# Patient Record
Sex: MALE | Race: ASIAN | NOT HISPANIC OR LATINO
[De-identification: names, ages, dates, MRNs, and addresses within clinical notes are randomized per-mention and may not be internally consistent; named-entity substitution may affect disease eponyms.]

---

## 2020-03-02 PROBLEM — Z00.00 ENCOUNTER FOR PREVENTIVE HEALTH EXAMINATION: Status: ACTIVE | Noted: 2020-03-02

## 2020-03-05 ENCOUNTER — APPOINTMENT (OUTPATIENT)
Dept: THORACIC SURGERY | Facility: CLINIC | Age: 75
End: 2020-03-05
Payer: MEDICARE

## 2020-03-05 VITALS
OXYGEN SATURATION: 96 % | HEIGHT: 62 IN | HEART RATE: 93 BPM | RESPIRATION RATE: 18 BRPM | TEMPERATURE: 97.4 F | BODY MASS INDEX: 25.76 KG/M2 | SYSTOLIC BLOOD PRESSURE: 128 MMHG | WEIGHT: 140 LBS | DIASTOLIC BLOOD PRESSURE: 73 MMHG

## 2020-03-05 DIAGNOSIS — Z87.891 PERSONAL HISTORY OF NICOTINE DEPENDENCE: ICD-10-CM

## 2020-03-05 DIAGNOSIS — Z87.438 PERSONAL HISTORY OF OTHER DISEASES OF MALE GENITAL ORGANS: ICD-10-CM

## 2020-03-05 DIAGNOSIS — Z86.39 PERSONAL HISTORY OF OTHER ENDOCRINE, NUTRITIONAL AND METABOLIC DISEASE: ICD-10-CM

## 2020-03-05 DIAGNOSIS — Z86.79 PERSONAL HISTORY OF OTHER DISEASES OF THE CIRCULATORY SYSTEM: ICD-10-CM

## 2020-03-05 PROCEDURE — 99205 OFFICE O/P NEW HI 60 MIN: CPT

## 2020-03-06 ENCOUNTER — APPOINTMENT (OUTPATIENT)
Dept: CARDIOLOGY | Facility: CLINIC | Age: 75
End: 2020-03-06
Payer: MEDICARE

## 2020-03-06 ENCOUNTER — NON-APPOINTMENT (OUTPATIENT)
Age: 75
End: 2020-03-06

## 2020-03-06 VITALS
DIASTOLIC BLOOD PRESSURE: 72 MMHG | SYSTOLIC BLOOD PRESSURE: 126 MMHG | WEIGHT: 138 LBS | TEMPERATURE: 98.6 F | OXYGEN SATURATION: 96 % | HEART RATE: 93 BPM | HEIGHT: 62.5 IN | RESPIRATION RATE: 18 BRPM | BODY MASS INDEX: 24.76 KG/M2

## 2020-03-06 DIAGNOSIS — R06.02 SHORTNESS OF BREATH: ICD-10-CM

## 2020-03-06 DIAGNOSIS — I10 ESSENTIAL (PRIMARY) HYPERTENSION: ICD-10-CM

## 2020-03-06 DIAGNOSIS — Z01.810 ENCOUNTER FOR PREPROCEDURAL CARDIOVASCULAR EXAMINATION: ICD-10-CM

## 2020-03-06 DIAGNOSIS — E78.5 HYPERLIPIDEMIA, UNSPECIFIED: ICD-10-CM

## 2020-03-06 PROBLEM — Z87.891 FORMER SMOKER: Status: ACTIVE | Noted: 2020-03-06

## 2020-03-06 PROBLEM — Z87.438 HISTORY OF BENIGN PROSTATIC HYPERPLASIA: Status: RESOLVED | Noted: 2020-03-06 | Resolved: 2020-03-06

## 2020-03-06 PROBLEM — Z86.39 HISTORY OF HYPERLIPIDEMIA: Status: RESOLVED | Noted: 2020-03-06 | Resolved: 2020-03-06

## 2020-03-06 PROBLEM — Z86.79 HISTORY OF HYPERTENSION: Status: RESOLVED | Noted: 2020-03-06 | Resolved: 2020-03-06

## 2020-03-06 PROCEDURE — 93306 TTE W/DOPPLER COMPLETE: CPT

## 2020-03-06 PROCEDURE — 93015 CV STRESS TEST SUPVJ I&R: CPT

## 2020-03-06 PROCEDURE — 93000 ELECTROCARDIOGRAM COMPLETE: CPT | Mod: 59

## 2020-03-06 PROCEDURE — 99203 OFFICE O/P NEW LOW 30 MIN: CPT | Mod: 25

## 2020-03-06 RX ORDER — ALFUZOSIN HYDROCHLORIDE 10 MG/1
10 TABLET, EXTENDED RELEASE ORAL
Qty: 90 | Refills: 0 | Status: ACTIVE | COMMUNITY
Start: 2019-12-28

## 2020-03-06 RX ORDER — EZETIMIBE 10 MG/1
10 TABLET ORAL
Qty: 30 | Refills: 0 | Status: ACTIVE | COMMUNITY
Start: 2020-03-02

## 2020-03-06 RX ORDER — COLESEVELAM HYDROCHLORIDE 625 MG/1
625 TABLET, COATED ORAL
Refills: 0 | Status: ACTIVE | COMMUNITY

## 2020-03-06 RX ORDER — AMOXICILLIN AND CLAVULANATE POTASSIUM 500; 125 MG/1; MG/1
500-125 TABLET, FILM COATED ORAL
Qty: 20 | Refills: 0 | Status: COMPLETED | COMMUNITY
Start: 2020-01-27

## 2020-03-06 RX ORDER — LOSARTAN POTASSIUM 25 MG/1
25 TABLET, FILM COATED ORAL
Qty: 30 | Refills: 0 | Status: ACTIVE | COMMUNITY
Start: 2019-10-07

## 2020-03-06 RX ORDER — ASPIRIN 81 MG
81 TABLET, DELAYED RELEASE (ENTERIC COATED) ORAL
Refills: 0 | Status: ACTIVE | COMMUNITY

## 2020-03-06 NOTE — DATA REVIEWED
[FreeTextEntry1] : CT Chest on 1/21/2020:\par - 2.9 x 2cm subsolid, predominantly groundglass opacity in the RUL (3:23)\par - a 1.6cm Rt apical ggo (3;17)\par \par FNA of RUL on 2/10/2020 at [facility]/. Path revealed RUL AdenoCA, +TTF-1. PD-L1, ALK, and ROS1 negative.\par \par PFTs on 2/18/2020: %, FEV1 131%, DLCO 102%.\par \par Brain MRI on 2/21/2020:\par - CHUY\par \par PET/CT on 2/27/2020:\par - few stable active nodes involving both sides of the neck: a 1.5cm SUV=7.7 Rt submandibular node (image 28); an 8mm SUV=4.5 Lt submandibular node (image 25)\par - a stable 1.8cm nodule in the subcutaneous fat in the Rt maxilla (image 19) w/out activity, likely benign\par - a 2.8cm SUV=3.5 mixed solid and groundglass opacity in the Rt lung apex (image 47), suspicious for malignancy\par - a 2.5cm SUV=2.2 Rt lung apex ggo (image 43), suspicious for malignancy\par - mild fatty infiltration of the liver\par

## 2020-03-06 NOTE — HISTORY OF PRESENT ILLNESS
[FreeTextEntry1] : Mr. JOSH ALLEN, 75 year old male, former smoker, w/ hx of HTN, HLD, BPH, who was seen by Dr. Edvin Vera (ENT) due to " neck swelling", CT neck revealed area of atelectasis or infiltrate at the posterior right lung apex. \par \par CT Chest on 1/21/2020:\par - 2.9 x 2cm subsolid, predominantly groundglass opacity in the RUL (3:23)\par - a 1.6cm Rt apical ggo (3;17)\par \par FNA of RUL on 2/10/2020 at [facility]/. Path revealed RUL AdenoCA, +TTF-1. PD-L1, ALK, and ROS1 negative. \par \par PFTs on 2/18/2020: %, FEV1 131%, DLCO 102%.\par \par Brain MRI on 2/21/2020:\par - CHUY\par \par PET/CT on 2/27/2020:\par - few stable active nodes involving both sides of the neck: a 1.5cm SUV=7.7 Rt submandibular node (image 28); an 8mm SUV=4.5 Lt submandibular node (image 25)\par - a stable 1.8cm nodule in the subcutaneous fat in the Rt maxilla (image 19) w/out activity, likely benign\par - a 2.8cm SUV=3.5 mixed solid and groundglass opacity in the Rt lung apex (image 47), suspicious for malignancy\par - a 2.5cm SUV=2.2 Rt lung apex ggo (image 43), suspicious for malignancy\par - mild fatty infiltration of the liver\par \par Of note, as per patient, he was seen by ENT and had negative laryngoscopy before. \par \par Patient is here today for CT Sx consultation, referred by Pulm Dr. Ravi Samuel. Patient denies shortness of breath, cough, chest pain, fever, chills, loss of appetite, weight loss, or hemoptysis.

## 2020-03-06 NOTE — CONSULT LETTER
[Dear  ___] : Dear  [unfilled], [Consult Letter:] : I had the pleasure of evaluating your patient, [unfilled]. [Please see my note below.] : Please see my note below. [( Thank you for referring [unfilled] for consultation for _____ )] : Thank you for referring [unfilled] for consultation for [unfilled] [Consult Closing:] : Thank you very much for allowing me to participate in the care of this patient.  If you have any questions, please do not hesitate to contact me. [Sincerely,] : Sincerely, [FreeTextEntry2] : Ravi Samuel MD (Pulm/Referring)\par Jethro Abernathy MD (PCP)\par Edvin Vera MD (ENT) [FreeTextEntry3] : Fahad Ann MD, MPH \par System Director of Thoracic Surgery \par Director of [facility] [place] \par Professor Cardiovascular & Thoracic Surgery  \par [employer] [facility]\par

## 2020-03-06 NOTE — ASSESSMENT
[FreeTextEntry1] : Mr. JOSH ALLEN, 75 year old male, former smoker, w/ hx of HTN, HLD, BPH, who was seen by Dr. Edvin Vera (ENT) due to "neck swelling", CT neck revealed area of atelectasis or infiltrate at the posterior right lung apex. \par \par CT Chest on 1/21/2020:\par - 2.9 x 2cm subsolid, predominantly groundglass opacity in the RUL (3:23)\par - a 1.6cm Rt apical ggo (3;17)\par \par FNA of RUL on 2/10/2020 at [facility]/. Path revealed RUL AdenoCA, +TTF-1. PD-L1, ALK, and ROS1 negative. \par \par PFTs on 2/18/2020: %, FEV1 131%, DLCO 102%.\par \par Brain MRI on 2/21/2020:\par - CHUY\par \par PET/CT on 2/27/2020:\par - few stable active nodes involving both sides of the neck: a 1.5cm SUV=7.7 Rt submandibular node (image 28); an 8mm SUV=4.5 Lt submandibular node (image 25)\par - a stable 1.8cm nodule in the subcutaneous fat in the Rt maxilla (image 19) w/out activity, likely benign\par - a 2.8cm SUV=3.5 mixed solid and groundglass opacity in the Rt lung apex (image 47), suspicious for malignancy\par - a 2.5cm SUV=2.2 Rt lung apex ggo (image 43), suspicious for malignancy\par - mild fatty infiltration of the liver\par \par Of note, as per patient, he was seen by ENT and had negative laryngoscopy before. \par \par I have reviewed the patient's medical records and diagnostic images at time of this office consultation and have made the following recommendation:\par 1. CT chest, PET/CT, path revealed and explained to patient and his family members, I recommended right VATS, Robotic-assisted, RULobectomy, MLND on 03/23/2020. Risks and benefits and alternatives explained to patient, all questions answered, patient agreed to proceed with surgery.\par 2. Prior to surgery, I would like patient to f/u with Dr. Edvin Vera (ENT) for possible right submandibular node bx to evaluate active nodes. \par 3. Medical clearance, cardiac clearance, and PST. \par 4. Obtain slides from [facility].  \par \par \par I personally performed the services described in the documentation, reviewed the documentation recorded by the scribe in my presence and it accurately and completely records my words and actions.\par \par I, Nevin Anton NP, am scribing for and the presence of ZEINAB Palomino, the following sections HISTORY OF PRESENT ILLNESS, PAST MEDICAL/FAMILY/SOCIAL HISTORY; REVIEW OF SYSTEMS; VITAL SIGNS; PHYSICAL EXAM; DISPOSITION.

## 2020-03-06 NOTE — PHYSICAL EXAM
[General Appearance - Alert] : alert [General Appearance - In No Acute Distress] : in no acute distress [Sclera] : the sclera and conjunctiva were normal [PERRL With Normal Accommodation] : pupils were equal in size, round, and reactive to light [Extraocular Movements] : extraocular movements were intact [Outer Ear] : the ears and nose were normal in appearance [Oropharynx] : the oropharynx was normal [Neck Appearance] : the appearance of the neck was normal [Neck Cervical Mass (___cm)] : no neck mass was observed [Jugular Venous Distention Increased] : there was no jugular-venous distention [Thyroid Diffuse Enlargement] : the thyroid was not enlarged [Thyroid Nodule] : there were no palpable thyroid nodules [Auscultation Breath Sounds / Voice Sounds] : lungs were clear to auscultation bilaterally [Heart Rate And Rhythm] : heart rate was normal and rhythm regular [Heart Sounds] : normal S1 and S2 [Heart Sounds Gallop] : no gallops [Murmurs] : no murmurs [Heart Sounds Pericardial Friction Rub] : no pericardial rub [Examination Of The Chest] : the chest was normal in appearance [Chest Visual Inspection Thoracic Asymmetry] : no chest asymmetry [Diminished Respiratory Excursion] : normal chest expansion [2+] : left 2+ [No Abnormalities] : the abdominal aorta was not enlarged and no bruit was heard [Breast Appearance] : normal in appearance [Breast Palpation Mass] : no palpable masses [Bowel Sounds] : normal bowel sounds [Abdomen Soft] : soft [Abdomen Tenderness] : non-tender [Abdomen Mass (___ Cm)] : no abdominal mass palpated [Cervical Lymph Nodes Enlarged Posterior Bilaterally] : posterior cervical [Cervical Lymph Nodes Enlarged Anterior Bilaterally] : anterior cervical [Supraclavicular Lymph Nodes Enlarged Bilaterally] : supraclavicular [No CVA Tenderness] : no ~M costovertebral angle tenderness [No Spinal Tenderness] : no spinal tenderness [Abnormal Walk] : normal gait [Nail Clubbing] : no clubbing  or cyanosis of the fingernails [Musculoskeletal - Swelling] : no joint swelling seen [Motor Tone] : muscle strength and tone were normal [Skin Color & Pigmentation] : normal skin color and pigmentation [Skin Turgor] : normal skin turgor [] : no rash [Deep Tendon Reflexes (DTR)] : deep tendon reflexes were 2+ and symmetric [Sensation] : the sensory exam was normal to light touch and pinprick [No Focal Deficits] : no focal deficits [Oriented To Time, Place, And Person] : oriented to person, place, and time [Impaired Insight] : insight and judgment were intact [Affect] : the affect was normal [Right Carotid Bruit] : no bruit heard over the right carotid [Left Carotid Bruit] : no bruit heard over the left carotid [Right Femoral Bruit] : no bruit heard over the right femoral artery [Left Femoral Bruit] : no bruit heard over the left femoral artery [FreeTextEntry1] : Deferred

## 2020-03-10 ENCOUNTER — OUTPATIENT (OUTPATIENT)
Dept: OUTPATIENT SERVICES | Facility: HOSPITAL | Age: 75
LOS: 1 days | End: 2020-03-10
Payer: MEDICARE

## 2020-03-10 ENCOUNTER — RESULT REVIEW (OUTPATIENT)
Age: 75
End: 2020-03-10

## 2020-03-10 VITALS
HEIGHT: 62 IN | WEIGHT: 138.01 LBS | TEMPERATURE: 98 F | SYSTOLIC BLOOD PRESSURE: 130 MMHG | DIASTOLIC BLOOD PRESSURE: 56 MMHG | RESPIRATION RATE: 16 BRPM | OXYGEN SATURATION: 98 % | HEART RATE: 98 BPM

## 2020-03-10 DIAGNOSIS — C34.91 MALIGNANT NEOPLASM OF UNSPECIFIED PART OF RIGHT BRONCHUS OR LUNG: ICD-10-CM

## 2020-03-10 PROBLEM — I10 HTN (HYPERTENSION): Status: ACTIVE | Noted: 2020-03-10

## 2020-03-10 PROBLEM — R06.02 SOB (SHORTNESS OF BREATH): Status: ACTIVE | Noted: 2020-03-06

## 2020-03-10 PROBLEM — E78.5 HLD (HYPERLIPIDEMIA): Status: ACTIVE | Noted: 2020-03-10

## 2020-03-10 PROBLEM — Z01.810 PRE-OPERATIVE CARDIOVASCULAR EXAMINATION: Status: ACTIVE | Noted: 2020-03-10

## 2020-03-10 LAB
ANION GAP SERPL CALC-SCNC: 12 MMO/L — SIGNIFICANT CHANGE UP (ref 7–14)
BLD GP AB SCN SERPL QL: NEGATIVE — SIGNIFICANT CHANGE UP
BUN SERPL-MCNC: 14 MG/DL — SIGNIFICANT CHANGE UP (ref 7–23)
CALCIUM SERPL-MCNC: 9.4 MG/DL — SIGNIFICANT CHANGE UP (ref 8.4–10.5)
CHLORIDE SERPL-SCNC: 104 MMOL/L — SIGNIFICANT CHANGE UP (ref 98–107)
CO2 SERPL-SCNC: 24 MMOL/L — SIGNIFICANT CHANGE UP (ref 22–31)
CREAT SERPL-MCNC: 0.93 MG/DL — SIGNIFICANT CHANGE UP (ref 0.5–1.3)
GLUCOSE SERPL-MCNC: 129 MG/DL — HIGH (ref 70–99)
HCT VFR BLD CALC: 39.4 % — SIGNIFICANT CHANGE UP (ref 39–50)
HGB BLD-MCNC: 13.5 G/DL — SIGNIFICANT CHANGE UP (ref 13–17)
MCHC RBC-ENTMCNC: 34.3 % — SIGNIFICANT CHANGE UP (ref 32–36)
MCHC RBC-ENTMCNC: 34.4 PG — HIGH (ref 27–34)
MCV RBC AUTO: 100.3 FL — HIGH (ref 80–100)
NRBC # FLD: 0 K/UL — SIGNIFICANT CHANGE UP (ref 0–0)
PLATELET # BLD AUTO: 227 K/UL — SIGNIFICANT CHANGE UP (ref 150–400)
PMV BLD: 9.3 FL — SIGNIFICANT CHANGE UP (ref 7–13)
POTASSIUM SERPL-MCNC: 3.4 MMOL/L — LOW (ref 3.5–5.3)
POTASSIUM SERPL-SCNC: 3.4 MMOL/L — LOW (ref 3.5–5.3)
RBC # BLD: 3.93 M/UL — LOW (ref 4.2–5.8)
RBC # FLD: 13.1 % — SIGNIFICANT CHANGE UP (ref 10.3–14.5)
RH IG SCN BLD-IMP: POSITIVE — SIGNIFICANT CHANGE UP
SODIUM SERPL-SCNC: 140 MMOL/L — SIGNIFICANT CHANGE UP (ref 135–145)
WBC # BLD: 5.7 K/UL — SIGNIFICANT CHANGE UP (ref 3.8–10.5)
WBC # FLD AUTO: 5.7 K/UL — SIGNIFICANT CHANGE UP (ref 3.8–10.5)

## 2020-03-10 PROCEDURE — 93010 ELECTROCARDIOGRAM REPORT: CPT

## 2020-03-10 NOTE — DISCUSSION/SUMMARY
[FreeTextEntry1] : 75 year-old male with HTN and HLD presents for cardiac clearance prior to lung surgery.  Patient reports no cardiac history.  Patient reports SOB with stairs.  Patient denies CP, palpitations, or lightheadedness.  Patient was noted to have a lung mass.  He is on Losartan 25 mg for HTN.  He is on ASA 81 mg for cardioprotection.\par \par (1) Cardiac clearance prior to lung surgery - Patient reports no cardiac history.  Patient reports PANIAGUA.  Patient underwent an echocardiogram and it showed normal LV function without significant valvular pathology. Patient underwent a treadmill stress test and completed 6 minutes of Rob protocol.  There were upsloping ST depressions on ECG but no symptoms.  Following treadmill stress, there was no echocardiographic evidence of ischemia.  Patient is therefore cleared for surgery and anesthesia.  His HR was elevated.  I advised patient to take Metoprolol ER 25 mg daily.  He may hold ASA 81 mg for 7 days prior to surgery.\par \par (2) HTN - His BP was good.  He should continue Losartan 25 mg.  Metoprolol ER 25 mg will be added for elevated HR.\par \par (3) Followup - as needed.

## 2020-03-10 NOTE — H&P PST ADULT - NSANTHOSAYNRD_GEN_A_CORE
No. RY screening performed.  STOP BANG Legend: 0-2 = LOW Risk; 3-4 = INTERMEDIATE Risk; 5-8 = HIGH Risk

## 2020-03-10 NOTE — H&P PST ADULT - NEGATIVE CARDIOVASCULAR SYMPTOMS
no palpitations/no orthopnea/no paroxysmal nocturnal dyspnea/no claudication/no chest pain/no dyspnea on exertion/no peripheral edema

## 2020-03-10 NOTE — H&P PST ADULT - NSICDXPROBLEM_GEN_ALL_CORE_FT
PROBLEM DIAGNOSES  Problem: Hypertension  Assessment and Plan: Pt instructed to take metoprolol on morning of surgery     Problem: Malignant neoplasm of unspecified part of right bronchus or lung  Assessment and Plan: for scheduled right VAT, robotic assisted right upper lobectomy, mediastinal lymph node dissection on 3/23/2020.   Written & verbal preop instructions, gi prophylaxis & surgical soap given  Pt verbalized good understanding.  Teach back done on surgical soap instructions. PROBLEM DIAGNOSES  Problem: Hypertension  Assessment and Plan: Pt instructed to take metoprolol on morning of surgery   Dmitry precautions recommended.  OR booking notified via fax.  copy of cardiology studies in chart       Problem: Malignant neoplasm of unspecified part of right bronchus or lung  Assessment and Plan: for scheduled right VAT, robotic assisted right upper lobectomy, mediastinal lymph node dissection on 3/23/2020.   Written & verbal preop instructions, gi prophylaxis & surgical soap given  Pt verbalized good understanding.  Teach back done on surgical soap instructions.

## 2020-03-10 NOTE — H&P PST ADULT - NEGATIVE GENERAL GENITOURINARY SYMPTOMS
normal urinary frequency/no flank pain L/no dysuria/no flank pain R/no urine discoloration/no renal colic

## 2020-03-10 NOTE — H&P PST ADULT - HISTORY OF PRESENT ILLNESS
74y/o male presents for preop eval for scheduled right VAT, robotic assisted right upper lobectomy, mediastinal lymph node dissection on 3/23/2020.  Pt states h/o mass on right cheek, followed up with PCP, xray with abnormal lung finding.  Referred to Dr Ann, ct scan & Pet scan done.  Preop dx malignant neoplasm of unspecified part of right bronchus or lung. 74y/o male presents for preop eval for scheduled right VAT, robotic assisted right upper lobectomy, mediastinal lymph node dissection on 3/23/2020.  Pt states h/o mass on right cheek, followed up with PCP, xray with abnormal lung finding.  Referred to Dr Ann, ct scan & Pet scan done.  Preop dx malignant neoplasm of unspecified part of right bronchus or lung.  Pt Cantonese speaking, speaks minimal English

## 2020-03-10 NOTE — REASON FOR VISIT
[Consultation] : a consultation regarding [FreeTextEntry1] : cardiac clearance prior to lung surgery

## 2020-03-10 NOTE — PHYSICAL EXAM
[General Appearance - Well Developed] : well developed [Normal Appearance] : normal appearance [Well Groomed] : well groomed [General Appearance - Well Nourished] : well nourished [No Deformities] : no deformities [General Appearance - In No Acute Distress] : no acute distress [Normal Conjunctiva] : the conjunctiva exhibited no abnormalities [Eyelids - No Xanthelasma] : the eyelids demonstrated no xanthelasmas [Normal Oral Mucosa] : normal oral mucosa [No Oral Pallor] : no oral pallor [No Oral Cyanosis] : no oral cyanosis [Normal Jugular Venous A Waves Present] : normal jugular venous A waves present [Normal Jugular Venous V Waves Present] : normal jugular venous V waves present [No Jugular Venous Kelley A Waves] : no jugular venous kelley A waves [Heart Rate And Rhythm] : heart rate and rhythm were normal [Heart Sounds] : normal S1 and S2 [Murmurs] : no murmurs present [Respiration, Rhythm And Depth] : normal respiratory rhythm and effort [Exaggerated Use Of Accessory Muscles For Inspiration] : no accessory muscle use [Auscultation Breath Sounds / Voice Sounds] : lungs were clear to auscultation bilaterally [Abdomen Soft] : soft [Abdomen Tenderness] : non-tender [Abdomen Mass (___ Cm)] : no abdominal mass palpated [Abnormal Walk] : normal gait [Gait - Sufficient For Exercise Testing] : the gait was sufficient for exercise testing [Nail Clubbing] : no clubbing of the fingernails [Cyanosis, Localized] : no localized cyanosis [Petechial Hemorrhages (___cm)] : no petechial hemorrhages [] : no ischemic changes [Oriented To Time, Place, And Person] : oriented to person, place, and time [Affect] : the affect was normal [Mood] : the mood was normal [No Anxiety] : not feeling anxious [Arterial Pulses Normal] : the arterial pulses were normal [FreeTextEntry1] : Trace edema noted.

## 2020-03-10 NOTE — HISTORY OF PRESENT ILLNESS
[FreeTextEntry1] : 75 year-old male with HTN and HLD presents for cardiac clearance prior to lung surgery.  Patient reports no cardiac history.  Patient reports SOB with stairs.  Patient denies CP, palpitations, or lightheadedness.  Patient was noted to have a lung mass.  He is on Losartan 25 mg for HTN.  He is on ASA 81 mg for cardioprotection.

## 2020-03-18 ENCOUNTER — APPOINTMENT (OUTPATIENT)
Dept: THORACIC SURGERY | Facility: HOSPITAL | Age: 75
End: 2020-03-18

## 2020-03-18 ENCOUNTER — RESULT REVIEW (OUTPATIENT)
Age: 75
End: 2020-03-18

## 2020-03-18 ENCOUNTER — TRANSCRIPTION ENCOUNTER (OUTPATIENT)
Age: 75
End: 2020-03-18

## 2020-03-18 ENCOUNTER — INPATIENT (INPATIENT)
Facility: HOSPITAL | Age: 75
LOS: 4 days | Discharge: HOME HEALTH | End: 2020-03-23
Attending: THORACIC SURGERY (CARDIOTHORACIC VASCULAR SURGERY) | Admitting: THORACIC SURGERY (CARDIOTHORACIC VASCULAR SURGERY)
Payer: MEDICARE

## 2020-03-18 VITALS
OXYGEN SATURATION: 96 % | DIASTOLIC BLOOD PRESSURE: 75 MMHG | SYSTOLIC BLOOD PRESSURE: 159 MMHG | TEMPERATURE: 99 F | HEIGHT: 62 IN | WEIGHT: 138.01 LBS | HEART RATE: 86 BPM | RESPIRATION RATE: 16 BRPM

## 2020-03-18 DIAGNOSIS — C34.91 MALIGNANT NEOPLASM OF UNSPECIFIED PART OF RIGHT BRONCHUS OR LUNG: ICD-10-CM

## 2020-03-18 DIAGNOSIS — I10 ESSENTIAL (PRIMARY) HYPERTENSION: ICD-10-CM

## 2020-03-18 LAB — RH IG SCN BLD-IMP: POSITIVE — SIGNIFICANT CHANGE UP

## 2020-03-18 PROCEDURE — 88305 TISSUE EXAM BY PATHOLOGIST: CPT | Mod: 26

## 2020-03-18 PROCEDURE — 88313 SPECIAL STAINS GROUP 2: CPT | Mod: 26

## 2020-03-18 PROCEDURE — 32663 THORACOSCOPY W/LOBECTOMY: CPT | Mod: AS

## 2020-03-18 PROCEDURE — 32663 THORACOSCOPY W/LOBECTOMY: CPT

## 2020-03-18 PROCEDURE — S2900 ROBOTIC SURGICAL SYSTEM: CPT | Mod: NC

## 2020-03-18 PROCEDURE — 71045 X-RAY EXAM CHEST 1 VIEW: CPT | Mod: 26

## 2020-03-18 PROCEDURE — 88309 TISSUE EXAM BY PATHOLOGIST: CPT | Mod: 26

## 2020-03-18 PROCEDURE — 32674 THORACOSCOPY LYMPH NODE EXC: CPT

## 2020-03-18 PROCEDURE — 32674 THORACOSCOPY LYMPH NODE EXC: CPT | Mod: AS

## 2020-03-18 RX ORDER — NALOXONE HYDROCHLORIDE 4 MG/.1ML
0.1 SPRAY NASAL
Refills: 0 | Status: DISCONTINUED | OUTPATIENT
Start: 2020-03-18 | End: 2020-03-19

## 2020-03-18 RX ORDER — HYDROMORPHONE HYDROCHLORIDE 2 MG/ML
0.5 INJECTION INTRAMUSCULAR; INTRAVENOUS; SUBCUTANEOUS
Refills: 0 | Status: DISCONTINUED | OUTPATIENT
Start: 2020-03-18 | End: 2020-03-19

## 2020-03-18 RX ORDER — HYDROMORPHONE HYDROCHLORIDE 2 MG/ML
0.25 INJECTION INTRAMUSCULAR; INTRAVENOUS; SUBCUTANEOUS
Refills: 0 | Status: DISCONTINUED | OUTPATIENT
Start: 2020-03-18 | End: 2020-03-19

## 2020-03-18 RX ORDER — TAMSULOSIN HYDROCHLORIDE 0.4 MG/1
0.8 CAPSULE ORAL AT BEDTIME
Refills: 0 | Status: DISCONTINUED | OUTPATIENT
Start: 2020-03-18 | End: 2020-03-23

## 2020-03-18 RX ORDER — OMEGA-3 ACID ETHYL ESTERS 1 G
1 CAPSULE ORAL
Qty: 0 | Refills: 0 | DISCHARGE

## 2020-03-18 RX ORDER — ALFUZOSIN HYDROCHLORIDE 10 MG/1
1 TABLET, EXTENDED RELEASE ORAL
Qty: 0 | Refills: 0 | DISCHARGE

## 2020-03-18 RX ORDER — HEPARIN SODIUM 5000 [USP'U]/ML
5000 INJECTION INTRAVENOUS; SUBCUTANEOUS ONCE
Refills: 0 | Status: COMPLETED | OUTPATIENT
Start: 2020-03-18 | End: 2020-03-18

## 2020-03-18 RX ORDER — COLESEVELAM HYDROCHLORIDE 625 MG/1
1 TABLET, FILM COATED ORAL
Qty: 0 | Refills: 0 | DISCHARGE

## 2020-03-18 RX ORDER — ONDANSETRON 8 MG/1
4 TABLET, FILM COATED ORAL EVERY 6 HOURS
Refills: 0 | Status: DISCONTINUED | OUTPATIENT
Start: 2020-03-18 | End: 2020-03-19

## 2020-03-18 RX ORDER — HEPARIN SODIUM 5000 [USP'U]/ML
5000 INJECTION INTRAVENOUS; SUBCUTANEOUS EVERY 8 HOURS
Refills: 0 | Status: DISCONTINUED | OUTPATIENT
Start: 2020-03-18 | End: 2020-03-23

## 2020-03-18 RX ORDER — ONDANSETRON 8 MG/1
4 TABLET, FILM COATED ORAL ONCE
Refills: 0 | Status: DISCONTINUED | OUTPATIENT
Start: 2020-03-18 | End: 2020-03-19

## 2020-03-18 RX ORDER — ACETAMINOPHEN 500 MG
975 TABLET ORAL ONCE
Refills: 0 | Status: COMPLETED | OUTPATIENT
Start: 2020-03-18 | End: 2020-03-18

## 2020-03-18 RX ORDER — HYDROMORPHONE HYDROCHLORIDE 2 MG/ML
30 INJECTION INTRAMUSCULAR; INTRAVENOUS; SUBCUTANEOUS
Refills: 0 | Status: DISCONTINUED | OUTPATIENT
Start: 2020-03-18 | End: 2020-03-19

## 2020-03-18 RX ORDER — GABAPENTIN 400 MG/1
100 CAPSULE ORAL ONCE
Refills: 0 | Status: COMPLETED | OUTPATIENT
Start: 2020-03-18 | End: 2020-03-18

## 2020-03-18 RX ORDER — EZETIMIBE 10 MG/1
1 TABLET ORAL
Qty: 0 | Refills: 0 | DISCHARGE

## 2020-03-18 RX ORDER — METOPROLOL TARTRATE 50 MG
25 TABLET ORAL DAILY
Refills: 0 | Status: DISCONTINUED | OUTPATIENT
Start: 2020-03-19 | End: 2020-03-20

## 2020-03-18 RX ORDER — SODIUM CHLORIDE 9 MG/ML
1000 INJECTION, SOLUTION INTRAVENOUS
Refills: 0 | Status: DISCONTINUED | OUTPATIENT
Start: 2020-03-18 | End: 2020-03-19

## 2020-03-18 RX ORDER — SENNA PLUS 8.6 MG/1
2 TABLET ORAL AT BEDTIME
Refills: 0 | Status: DISCONTINUED | OUTPATIENT
Start: 2020-03-18 | End: 2020-03-23

## 2020-03-18 RX ORDER — ASPIRIN/CALCIUM CARB/MAGNESIUM 324 MG
1 TABLET ORAL
Qty: 0 | Refills: 0 | DISCHARGE

## 2020-03-18 RX ADMIN — SENNA PLUS 2 TABLET(S): 8.6 TABLET ORAL at 22:43

## 2020-03-18 RX ADMIN — HEPARIN SODIUM 5000 UNIT(S): 5000 INJECTION INTRAVENOUS; SUBCUTANEOUS at 07:23

## 2020-03-18 RX ADMIN — HEPARIN SODIUM 5000 UNIT(S): 5000 INJECTION INTRAVENOUS; SUBCUTANEOUS at 15:55

## 2020-03-18 RX ADMIN — TAMSULOSIN HYDROCHLORIDE 0.8 MILLIGRAM(S): 0.4 CAPSULE ORAL at 22:43

## 2020-03-18 RX ADMIN — SODIUM CHLORIDE 30 MILLILITER(S): 9 INJECTION, SOLUTION INTRAVENOUS at 16:21

## 2020-03-18 RX ADMIN — HEPARIN SODIUM 5000 UNIT(S): 5000 INJECTION INTRAVENOUS; SUBCUTANEOUS at 22:42

## 2020-03-18 RX ADMIN — GABAPENTIN 100 MILLIGRAM(S): 400 CAPSULE ORAL at 07:24

## 2020-03-18 RX ADMIN — Medication 975 MILLIGRAM(S): at 07:24

## 2020-03-18 RX ADMIN — HYDROMORPHONE HYDROCHLORIDE 30 MILLILITER(S): 2 INJECTION INTRAMUSCULAR; INTRAVENOUS; SUBCUTANEOUS at 12:00

## 2020-03-18 NOTE — BRIEF OPERATIVE NOTE - OPERATION/FINDINGS
Robotic assisted R VATS, RUL lobectomy, mediastinal lymph node dissection  28 Lao chest tube to suction

## 2020-03-18 NOTE — DISCHARGE NOTE PROVIDER - HOSPITAL COURSE
74y/o male states h/o mass on right cheek, followed up with PCP, xray with abnormal lung finding.  Referred to Dr Ann, ct scan & Pet scan done.  Preop dx malignant neoplasm of unspecified part of right bronchus or lung.  Pt Cantonese speaking, speaks minimal English.  Patient s/p robotic assisted right vats, right upper lobectomy and MLND on 3/18/20.  Post op course without complication, patient stable for discharge home when chest tube removed. 76y/o male states h/o mass on right cheek, followed up with PCP, xray with abnormal lung finding.  Referred to Dr Ann, ct scan & Pet scan done.  Preop dx malignant neoplasm of unspecified part of right bronchus or lung.  Pt Cantonese speaking, speaks minimal English.  Patient s/p robotic assisted right vats, right upper lobectomy and MLND on 3/18/20.  Post op course pt had continued air leak with small ptx seen on CXR. On POD 2 chest tube    placed back to suction for worsening ptx. Air leak continued to be seen. On POD 2 and 3 pt with ST and PACS, given increased beta blockers and electrolyte suppletion. Yesterday on POD 4 pt had waterseal trial again and passed. CXR remained w stable sml ptx. Today CT was clamped for clamping trial. 76y/o male states h/o mass on right cheek, followed up with PCP, xray with abnormal lung finding.  Referred to Dr Ann, ct scan & Pet scan done.  Preop dx malignant neoplasm of unspecified part of right bronchus or lung.  Pt Cantonese speaking, speaks minimal English.  Patient s/p robotic assisted right vats, right upper lobectomy and MLND on 3/18/20.  Post op course pt had continued air leak with small ptx seen on CXR. On POD 2 chest tube    placed back to suction for worsening ptx. Air leak continued to be seen. On POD 2 and 3 pt with ST and PACS, given increased beta blockers and electrolyte suppletion. Yesterday on POD 4 pt had waterseal trial again and passed. CXR remained w stable sml ptx. Today CT was clamped for clamping trial. FU CXR showed no change in ptx however when pleuravac tubing unclamped large air leak seen. Per Dr. Ann, Pneumostat    placed to chest tube and VNS arranged for home care. Cleared for d/c to home by Dr. Ann with Chest tube to pneumostat.  Pt feels well. Ambulating frequently on unit. CT site c/d/i    VATS c/d/i. Lungs CTA. Pt with + BM.    Vital Signs Last 24 Hrs    T(C): 37.3 (23 Mar 2020 12:27), Max: 37.3 (23 Mar 2020 12:27)    T(F): 99.2 (23 Mar 2020 12:27), Max: 99.2 (23 Mar 2020 12:27)    HR: 87 (23 Mar 2020 12:27) (63 - 96)    BP: 136/52 (23 Mar 2020 12:27) (103/48 - 136/52)    BP(mean): --    RR: 18 (23 Mar 2020 12:27) (17 - 18)    SpO2: 98% (23 Mar 2020 12:27) (95% - 99%)

## 2020-03-18 NOTE — DISCHARGE NOTE PROVIDER - NSDCFUADDAPPT_GEN_ALL_CORE_FT
Follow up with Dr. Ann in 1-2 weeks [phone]   Chest x-ray 1-2 days prior to appointment with Dr. Ann  Please bring copy of x-ray to appointment with Dr. Ann   Follow up with primary care provider in one week Follow up with Dr. Ann this Thursday in the office. You will be called with an appointment time. Call the office with any questions.  [phone]   Chest x-ray to be done prior to appointment with Dr. Ann    Follow up with primary care provider or Cardiologist in one week to discuss your elevated heart rate and change in medications.

## 2020-03-18 NOTE — CHART NOTE - NSCHARTNOTEFT_GEN_A_CORE
Patient s/p Robotic assisted right vats, right upper lobectomy and MLND.  Patient resting comfortably.  Chest tube to suction, incision with dressing clean and dry.  Tolerating po and voiding  Vital Signs Last 24 Hrs  T(C): 36.8 (18 Mar 2020 18:00), Max: 37.2 (18 Mar 2020 06:30)  T(F): 98.2 (18 Mar 2020 18:00), Max: 99 (18 Mar 2020 06:30)  HR: 109 (18 Mar 2020 19:00) (86 - 109)  BP: 149/73 (18 Mar 2020 18:00) (122/67 - 159/75)  BP(mean): 91 (18 Mar 2020 18:00) (80 - 92)  RR: 23 (18 Mar 2020 19:00) (15 - 23)  SpO2: 100% (18 Mar 2020 19:00) (96% - 100%)    I&O's Detail    18 Mar 2020 07:01  -  18 Mar 2020 19:59  --------------------------------------------------------  IN:    lactated ringers.: 90 mL    lactated ringers.: 150 mL  Total IN: 240 mL    OUT:    Chest Tube: 70 mL    Voided: 250 mL  Total OUT: 320 mL    Total NET: -80 mL      MEDICATIONS  (STANDING):  heparin  Injectable 5000 Unit(s) SubCutaneous every 8 hours  HYDROmorphone PCA (1 mG/mL) 30 milliLiter(s) PCA Continuous PCA Continuous  lactated ringers. 1000 milliLiter(s) (30 mL/Hr) IV Continuous <Continuous>  senna 2 Tablet(s) Oral at bedtime  tamsulosin 0.8 milliGRAM(s) Oral at bedtime    A/P: S/P Robotic assisted right vats, right upper lobectomy and MLND   Continue chest tube to suction  Follow up labs and chest x-ray in am   Chest PT, ambulation and incentive spirometer   Continue IV PCA for pain management

## 2020-03-18 NOTE — DISCHARGE NOTE PROVIDER - CARE PROVIDERS DIRECT ADDRESSES
[email or web address]

## 2020-03-18 NOTE — DISCHARGE NOTE PROVIDER - NSDCCPCAREPLAN_GEN_ALL_CORE_FT
PRINCIPAL DISCHARGE DIAGNOSIS  Diagnosis: Malignant neoplasm of unspecified part of right bronchus or lung  Assessment and Plan of Treatment:

## 2020-03-18 NOTE — DISCHARGE NOTE PROVIDER - NSDCACTIVITY_GEN_ALL_CORE
Stairs allowed/Do not drive or operate machinery/Showering allowed/Do not make important decisions/Walking - Indoors allowed/No heavy lifting/straining/Walking - Outdoors allowed Stairs allowed/Showering allowed/Walking - Outdoors allowed/Sex allowed/Do not drive or operate machinery/Do not make important decisions/Walking - Indoors allowed/No heavy lifting/straining Walking - Indoors allowed/Sex allowed/Do not make important decisions/Walking - Outdoors allowed/Do not drive or operate machinery/Stairs allowed/No heavy lifting/straining

## 2020-03-18 NOTE — DISCHARGE NOTE PROVIDER - NSDCCPTREATMENT_GEN_ALL_CORE_FT
PRINCIPAL PROCEDURE  Procedure: Lobectomy, lung, upper lobe, right, robot-assisted  Findings and Treatment:

## 2020-03-18 NOTE — DISCHARGE NOTE PROVIDER - CARE PROVIDER_API CALL
Fahad Ann (MD)  Surgery; Thoracic Surgery  [id number] [place] [address]  Phone: [phone]  Fax: [phone]  Follow Up Time:

## 2020-03-18 NOTE — DISCHARGE NOTE PROVIDER - NSDCFUADDINST_GEN_ALL_CORE_FT
Please walk 4-5 x per day; increase as tolerated. You may climb stairs. Continue to use the incentive spirometer.   You may keep wounds uncovered. Please shower daily with soap and water. The suture will be removed in the office at the follow up appointment.   Please call the office at [phone] if you have fevers, chills, worsening shortness of breath, chest pain, warmth, redness or purulent discharge from the wound. Please walk 4-5 x per day; increase as tolerated. You may climb stairs. Continue to use the incentive spirometer.   YOu must keep chest tube site clean and dry. You cannot get it wet in shower. Just sponge bathe for now. Dressing at site will be changed daily by nurse., Please empty the pneumostat if it becomes full as you were instructed while in the hospital.   Please call the office at [phone] if you have fevers, chills, worsening shortness of breath, chest pain, warmth, redness or purulent discharge from the wound.

## 2020-03-18 NOTE — DISCHARGE NOTE PROVIDER - NSDCHHNEEDSERVICEOTHER_GEN_ALL_CORE_FT
Change Chest tube site dressing daily w DSD gauze and tape. Empty pneumostat if it becomes full w Luer lock syringe.

## 2020-03-18 NOTE — PACU DISCHARGE NOTE - COMMENTS
VSS & REVIEWED BY ANESTHESIA MD; NO ANESTHESIA RELATED COMPLICATIONS NOTED. PATIENT STABLE FOR TRANSFER TO FLOOR.

## 2020-03-18 NOTE — DISCHARGE NOTE PROVIDER - NSDCMRMEDTOKEN_GEN_ALL_CORE_FT
alfuzosin 10 mg oral tablet, extended release: 1 tab(s) orally once a day  aspirin 81 mg oral tablet: 1 tab(s) orally once a day  colesevelam 625 mg oral tablet: 1 tab(s) orally once a day  ezetimibe 10 mg oral tablet: 1 tab(s) orally once a day  Fish Oil oral capsule: 1 cap(s) orally once a day  Metoprolol Succinate ER 25 mg oral tablet, extended release: 1 tab(s) orally once a day  Omega-3 oral capsule: 1 cap(s) orally once a day alfuzosin 10 mg oral tablet, extended release: 1 tab(s) orally once a day  aspirin 81 mg oral tablet: 1 tab(s) orally once a day  bisacodyl 10 mg rectal suppository: 1 suppository(ies) rectal once a day, As needed, Constipation  colesevelam 625 mg oral tablet: 1 tab(s) orally once a day  ezetimibe 10 mg oral tablet: 1 tab(s) orally once a day  Fish Oil oral capsule: 1 cap(s) orally once a day  metoprolol tartrate 50 mg oral tablet: 1 tab(s) orally 2 times a day   Omega-3 oral capsule: 1 cap(s) orally once a day  oxyCODONE 5 mg oral tablet: 2 tab(s) orally every 4 hours, As Needed - for severe pain. Can take 1 for moderate pain. MDD:12  polyethylene glycol 3350 oral powder for reconstitution: 17 gram(s) orally once a day  senna oral tablet: 2 tab(s) orally once a day (at bedtime)  Tylenol 325 mg oral tablet: 2 tab(s) orally every 6 hours, As Needed

## 2020-03-18 NOTE — ASU PATIENT PROFILE, ADULT - PMH
BPH without urinary obstruction    Hypertension    Malignant neoplasm of bladder, unspecified  right  bronchus or lung

## 2020-03-19 LAB
ANION GAP SERPL CALC-SCNC: 13 MMO/L — SIGNIFICANT CHANGE UP (ref 7–14)
BASOPHILS # BLD AUTO: 0.02 K/UL — SIGNIFICANT CHANGE UP (ref 0–0.2)
BASOPHILS NFR BLD AUTO: 0.2 % — SIGNIFICANT CHANGE UP (ref 0–2)
BUN SERPL-MCNC: 14 MG/DL — SIGNIFICANT CHANGE UP (ref 7–23)
CALCIUM SERPL-MCNC: 9 MG/DL — SIGNIFICANT CHANGE UP (ref 8.4–10.5)
CHLORIDE SERPL-SCNC: 102 MMOL/L — SIGNIFICANT CHANGE UP (ref 98–107)
CO2 SERPL-SCNC: 23 MMOL/L — SIGNIFICANT CHANGE UP (ref 22–31)
CREAT SERPL-MCNC: 0.86 MG/DL — SIGNIFICANT CHANGE UP (ref 0.5–1.3)
EOSINOPHIL # BLD AUTO: 0.01 K/UL — SIGNIFICANT CHANGE UP (ref 0–0.5)
EOSINOPHIL NFR BLD AUTO: 0.1 % — SIGNIFICANT CHANGE UP (ref 0–6)
GLUCOSE SERPL-MCNC: 107 MG/DL — HIGH (ref 70–99)
HCT VFR BLD CALC: 42.4 % — SIGNIFICANT CHANGE UP (ref 39–50)
HGB BLD-MCNC: 14.3 G/DL — SIGNIFICANT CHANGE UP (ref 13–17)
IMM GRANULOCYTES NFR BLD AUTO: 0.4 % — SIGNIFICANT CHANGE UP (ref 0–1.5)
LYMPHOCYTES # BLD AUTO: 1.8 K/UL — SIGNIFICANT CHANGE UP (ref 1–3.3)
LYMPHOCYTES # BLD AUTO: 17 % — SIGNIFICANT CHANGE UP (ref 13–44)
MCHC RBC-ENTMCNC: 33.7 % — SIGNIFICANT CHANGE UP (ref 32–36)
MCHC RBC-ENTMCNC: 34.2 PG — HIGH (ref 27–34)
MCV RBC AUTO: 101.4 FL — HIGH (ref 80–100)
MONOCYTES # BLD AUTO: 0.82 K/UL — SIGNIFICANT CHANGE UP (ref 0–0.9)
MONOCYTES NFR BLD AUTO: 7.7 % — SIGNIFICANT CHANGE UP (ref 2–14)
NEUTROPHILS # BLD AUTO: 7.91 K/UL — HIGH (ref 1.8–7.4)
NEUTROPHILS NFR BLD AUTO: 74.6 % — SIGNIFICANT CHANGE UP (ref 43–77)
NRBC # FLD: 0 K/UL — SIGNIFICANT CHANGE UP (ref 0–0)
PLATELET # BLD AUTO: 212 K/UL — SIGNIFICANT CHANGE UP (ref 150–400)
PMV BLD: 9.6 FL — SIGNIFICANT CHANGE UP (ref 7–13)
POTASSIUM SERPL-MCNC: 4.3 MMOL/L — SIGNIFICANT CHANGE UP (ref 3.5–5.3)
POTASSIUM SERPL-SCNC: 4.3 MMOL/L — SIGNIFICANT CHANGE UP (ref 3.5–5.3)
RBC # BLD: 4.18 M/UL — LOW (ref 4.2–5.8)
RBC # FLD: 13.2 % — SIGNIFICANT CHANGE UP (ref 10.3–14.5)
SODIUM SERPL-SCNC: 138 MMOL/L — SIGNIFICANT CHANGE UP (ref 135–145)
WBC # BLD: 10.6 K/UL — HIGH (ref 3.8–10.5)
WBC # FLD AUTO: 10.6 K/UL — HIGH (ref 3.8–10.5)

## 2020-03-19 PROCEDURE — 71045 X-RAY EXAM CHEST 1 VIEW: CPT | Mod: 26

## 2020-03-19 PROCEDURE — 71045 X-RAY EXAM CHEST 1 VIEW: CPT | Mod: 26,77

## 2020-03-19 RX ORDER — OXYCODONE HYDROCHLORIDE 5 MG/1
10 TABLET ORAL EVERY 4 HOURS
Refills: 0 | Status: DISCONTINUED | OUTPATIENT
Start: 2020-03-19 | End: 2020-03-23

## 2020-03-19 RX ORDER — ACETAMINOPHEN 500 MG
650 TABLET ORAL EVERY 6 HOURS
Refills: 0 | Status: COMPLETED | OUTPATIENT
Start: 2020-03-19 | End: 2020-03-21

## 2020-03-19 RX ORDER — OXYCODONE HYDROCHLORIDE 5 MG/1
5 TABLET ORAL EVERY 4 HOURS
Refills: 0 | Status: DISCONTINUED | OUTPATIENT
Start: 2020-03-19 | End: 2020-03-23

## 2020-03-19 RX ADMIN — HEPARIN SODIUM 5000 UNIT(S): 5000 INJECTION INTRAVENOUS; SUBCUTANEOUS at 16:33

## 2020-03-19 RX ADMIN — Medication 650 MILLIGRAM(S): at 22:05

## 2020-03-19 RX ADMIN — HEPARIN SODIUM 5000 UNIT(S): 5000 INJECTION INTRAVENOUS; SUBCUTANEOUS at 22:09

## 2020-03-19 RX ADMIN — Medication 650 MILLIGRAM(S): at 14:00

## 2020-03-19 RX ADMIN — TAMSULOSIN HYDROCHLORIDE 0.8 MILLIGRAM(S): 0.4 CAPSULE ORAL at 22:05

## 2020-03-19 RX ADMIN — Medication 650 MILLIGRAM(S): at 13:09

## 2020-03-19 RX ADMIN — SENNA PLUS 2 TABLET(S): 8.6 TABLET ORAL at 22:06

## 2020-03-19 RX ADMIN — Medication 25 MILLIGRAM(S): at 06:05

## 2020-03-19 RX ADMIN — HEPARIN SODIUM 5000 UNIT(S): 5000 INJECTION INTRAVENOUS; SUBCUTANEOUS at 06:30

## 2020-03-19 NOTE — PROGRESS NOTE ADULT - SUBJECTIVE AND OBJECTIVE BOX
Anesthesia Pain Management Service    SUBJECTIVE: Patient is doing well with IV PCA and no significant problems reported.    Pain Scale Score	At rest: _6/10__ 	With Activity: ___ 	[X ] Refer to charted pain scores    THERAPY:    [ ] IV PCA Morphine		[ ] 5 mg/mL	[ ] 1 mg/mL  [X ] IV PCA Hydromorphone	[ ] 5 mg/mL	[X ] 1 mg/mL  [ ] IV PCA Fentanyl		[ ] 50 micrograms/mL    Demand dose __0.2_ lockout __6_ (minutes) Continuous Rate _0__ Total: __0_  mg used (in past 24 hours)      MEDICATIONS  (STANDING):  heparin  Injectable 5000 Unit(s) SubCutaneous every 8 hours  HYDROmorphone PCA (1 mG/mL) 30 milliLiter(s) PCA Continuous PCA Continuous  lactated ringers. 1000 milliLiter(s) (30 mL/Hr) IV Continuous <Continuous>  metoprolol succinate ER 25 milliGRAM(s) Oral daily  senna 2 Tablet(s) Oral at bedtime  tamsulosin 0.8 milliGRAM(s) Oral at bedtime    MEDICATIONS  (PRN):  HYDROmorphone  Injectable 0.25 milliGRAM(s) IV Push every 10 minutes PRN Moderate Pain (4 - 6)  HYDROmorphone  Injectable 0.5 milliGRAM(s) IV Push every 10 minutes PRN Severe Pain (7 - 10)  HYDROmorphone PCA (1 mG/mL) Rescue Clinician Bolus 0.5 milliGRAM(s) IV Push every 15 minutes PRN for Pain Scale GREATER THAN 6  naloxone Injectable 0.1 milliGRAM(s) IV Push every 3 minutes PRN For ANY of the following changes in patient status:  A. RR LESS THAN 10 breaths per minute, B. Oxygen saturation LESS THAN 90%, C. Sedation score of 6  ondansetron Injectable 4 milliGRAM(s) IV Push every 6 hours PRN Nausea  ondansetron Injectable 4 milliGRAM(s) IV Push once PRN Nausea and/or Vomiting      OBJECTIVE: Patient laying in bed.    Sedation Score:	[ X] Alert	[ ] Drowsy 	[ ] Arousable	[ ] Asleep	[ ] Unresponsive    Side Effects:	[X ] None	[ ] Nausea	[ ] Vomiting	[ ] Pruritus  		[ ] Other:    Vital Signs Last 24 Hrs  T(C): 36.9 (19 Mar 2020 05:00), Max: 36.9 (19 Mar 2020 00:00)  T(F): 98.4 (19 Mar 2020 05:00), Max: 98.4 (19 Mar 2020 00:00)  HR: 87 (19 Mar 2020 07:00) (84 - 109)  BP: 128/65 (19 Mar 2020 06:00) (116/64 - 149/73)  BP(mean): 80 (19 Mar 2020 06:00) (75 - 92)  RR: 22 (19 Mar 2020 07:00) (14 - 23)  SpO2: 100% (19 Mar 2020 07:00) (98% - 100%)    ASSESSMENT/ PLAN    Therapy to  be:	[ X] Continue   [ ] Discontinued   [ ] Change to prn Analgesics    Documentation and Verification of current medications:   [X] Done	[ ] Not done, not elligible    Comments: Chest tubex1 in place. Recommend non-opioid adjuvant analgesics to be used when possible and when allowed by primary surgical team.    Progress Note written now but Patient was seen earlier.

## 2020-03-19 NOTE — PROGRESS NOTE ADULT - SUBJECTIVE AND OBJECTIVE BOX
Anesthesia Pain Management Service    SUBJECTIVE: Patient is doing well with IV PCA and no significant problems reported.    Pain Scale Score	At rest: ___ 	With Activity: ___ 	[X ] Refer to charted pain scores    THERAPY:    [ ] IV PCA Morphine		[ ] 5 mg/mL	[ ] 1 mg/mL  [X ] IV PCA Hydromorphone	[ ] 5 mg/mL	[X ] 1 mg/mL  [ ] IV PCA Fentanyl		[ ] 50 micrograms/mL    Demand dose __0.2_ lockout __6_ (minutes) Continuous Rate _0__ Total: ___  Daily      MEDICATIONS  (STANDING):  acetaminophen   Tablet .. 650 milliGRAM(s) Oral every 6 hours  heparin  Injectable 5000 Unit(s) SubCutaneous every 8 hours  HYDROmorphone PCA (1 mG/mL) 30 milliLiter(s) PCA Continuous PCA Continuous  lactated ringers. 1000 milliLiter(s) (30 mL/Hr) IV Continuous <Continuous>  metoprolol succinate ER 25 milliGRAM(s) Oral daily  senna 2 Tablet(s) Oral at bedtime  tamsulosin 0.8 milliGRAM(s) Oral at bedtime    MEDICATIONS  (PRN):  HYDROmorphone  Injectable 0.25 milliGRAM(s) IV Push every 10 minutes PRN Moderate Pain (4 - 6)  HYDROmorphone  Injectable 0.5 milliGRAM(s) IV Push every 10 minutes PRN Severe Pain (7 - 10)  HYDROmorphone PCA (1 mG/mL) Rescue Clinician Bolus 0.5 milliGRAM(s) IV Push every 15 minutes PRN for Pain Scale GREATER THAN 6  naloxone Injectable 0.1 milliGRAM(s) IV Push every 3 minutes PRN For ANY of the following changes in patient status:  A. RR LESS THAN 10 breaths per minute, B. Oxygen saturation LESS THAN 90%, C. Sedation score of 6  ondansetron Injectable 4 milliGRAM(s) IV Push every 6 hours PRN Nausea  ondansetron Injectable 4 milliGRAM(s) IV Push once PRN Nausea and/or Vomiting      OBJECTIVE:    Sedation Score:	[ X] Alert	[ ] Drowsy 	[ ] Arousable	[ ] Asleep	[ ] Unresponsive    Side Effects:	[X ] None	[ ] Nausea	[ ] Vomiting	[ ] Pruritus  		[ ] Other:    Vital Signs Last 24 Hrs  T(C): 36.7 (19 Mar 2020 09:35), Max: 36.9 (19 Mar 2020 00:00)  T(F): 98.1 (19 Mar 2020 09:35), Max: 98.4 (19 Mar 2020 00:00)  HR: 92 (19 Mar 2020 09:35) (84 - 109)  BP: 120/54 (19 Mar 2020 09:35) (116/64 - 149/73)  BP(mean): 70 (19 Mar 2020 09:35) (70 - 91)  RR: 24 (19 Mar 2020 09:35) (14 - 24)  SpO2: 100% (19 Mar 2020 09:35) (98% - 100%)    ASSESSMENT/ PLAN    Therapy to  be:	[ X] Continue   [ ] Discontinued   [ ] Change to prn Analgesics    Documentation and Verification of current medications:   [X] Done	[ ] Not done, not elligible    Comments:

## 2020-03-19 NOTE — PROGRESS NOTE ADULT - SUBJECTIVE AND OBJECTIVE BOX
Subjective: "I feel ok, my pain isn't bad" pt seen. OOB in chair. Feels well. USing IS. Weaning off O2. No CP or SOB.     Vital Signs:  Vital Signs Last 24 Hrs  T(C): 36.7 (03-19-20 @ 09:35), Max: 36.9 (03-19-20 @ 00:00)  T(F): 98.1 (03-19-20 @ 09:35), Max: 98.4 (03-19-20 @ 00:00)  HR: 92 (03-19-20 @ 09:35) (84 - 109)  BP: 120/54 (03-19-20 @ 09:35) (116/64 - 149/73)  RR: 24 (03-19-20 @ 09:35) (14 - 24)  SpO2: 100% (03-19-20 @ 09:35) (98% - 100%) on (O2)    Telemetry/Alarms:  General: WN/WD NAD  Neurology: Awake, nonfocal, ORTIZ x 4  Eyes: Scleras clear, PERRLA/ EOMI, Gross vision intact  ENT:Gross hearing intact, grossly patent pharynx, no stridor  Neck: Neck supple, trachea midline, No JVD,   Respiratory: CTA B/L, No wheezing, rales, rhonchi. Lungs CTA  CV: RRR, S1S2, no murmurs, rubs or gallops  Abdominal: Soft, NT, ND +BS, no BM + Void  Extremities: No edema, + peripheral pulses  Skin: No Rashes, Hematoma, Ecchymosis  Lymphatic: No Neck, axilla, groin LAD  Psych: Oriented x 3, normal affect  Incisions: Rt. VATS c/d/i.   Tubes: Rt. CT 180cc/24hrs on suction. Very small forced expir air leak. Placed to waterseal this am.   Relevant labs, radiology and Medications reviewed           CXR stable.              14.3   10.60 )-----------( 212      ( 19 Mar 2020 05:45 )             42.4     03-19    138  |  102  |  14  ----------------------------<  107<H>  4.3   |  23  |  0.86    Ca    9.0      19 Mar 2020 05:45        MEDICATIONS  (STANDING):  acetaminophen   Tablet .. 650 milliGRAM(s) Oral every 6 hours  heparin  Injectable 5000 Unit(s) SubCutaneous every 8 hours  HYDROmorphone PCA (1 mG/mL) 30 milliLiter(s) PCA Continuous PCA Continuous  lactated ringers. 1000 milliLiter(s) (30 mL/Hr) IV Continuous <Continuous>  metoprolol succinate ER 25 milliGRAM(s) Oral daily  senna 2 Tablet(s) Oral at bedtime  tamsulosin 0.8 milliGRAM(s) Oral at bedtime    MEDICATIONS  (PRN):  HYDROmorphone  Injectable 0.25 milliGRAM(s) IV Push every 10 minutes PRN Moderate Pain (4 - 6)  HYDROmorphone  Injectable 0.5 milliGRAM(s) IV Push every 10 minutes PRN Severe Pain (7 - 10)  HYDROmorphone PCA (1 mG/mL) Rescue Clinician Bolus 0.5 milliGRAM(s) IV Push every 15 minutes PRN for Pain Scale GREATER THAN 6  naloxone Injectable 0.1 milliGRAM(s) IV Push every 3 minutes PRN For ANY of the following changes in patient status:  A. RR LESS THAN 10 breaths per minute, B. Oxygen saturation LESS THAN 90%, C. Sedation score of 6  ondansetron Injectable 4 milliGRAM(s) IV Push every 6 hours PRN Nausea  ondansetron Injectable 4 milliGRAM(s) IV Push once PRN Nausea and/or Vomiting    Pertinent Physical Exam  I&O's Summary    18 Mar 2020 07:01  -  19 Mar 2020 07:00  --------------------------------------------------------  IN: 850 mL / OUT: 1500 mL / NET: -650 mL    19 Mar 2020 07:01  -  19 Mar 2020 13:51  --------------------------------------------------------  IN: 440 mL / OUT: 90 mL / NET: 350 mL        Assessment  75y Male  w/ PAST MEDICAL & SURGICAL HISTORY:  Malignant neoplasm of bladder, unspecified: right  bronchus or lung  BPH without urinary obstruction  Hypertension  No significant past surgical history  admitted with complaints of Patient is a 75y old  Male who presents with a chief complaint of "right lung nodule" (10 Mar 2020 16:21)  .  On (Date), patient underwent Lobectomy, lung, upper lobe, right, robot-assisted  . Postoperative course/issues:  76yo M s/p Rt. VATS RUL lobectomy on 3/18/20. Post op doing well. [facility] to [facility].   PLAN  Neuro: Pain management  Pulm: Encourage coughing, deep breathing and use of incentive spirometry. Wean off supplemental oxygen as able. Daily CXR.   Cardio: Monitor telemetry/alarms  GI: Tolerating diet. Continue stool softeners.  Renal: monitor urine output, supplement electrolytes as needed  Vasc: Heparin SC/SCDs for DVT prophylaxis  Heme: Stable H/H. .   ID: Off antibiotics. Stable.  Therapy: OOB/ambulate  Tubes: Monitor Chest tube output. Will place CT to waterseal and rpt CXR.   Disposition: Aim to D/C to home once CT removed.   Discussed with Cardiothoracic Team at AM rounds.

## 2020-03-20 LAB
ANION GAP SERPL CALC-SCNC: 10 MMO/L — SIGNIFICANT CHANGE UP (ref 7–14)
BUN SERPL-MCNC: 18 MG/DL — SIGNIFICANT CHANGE UP (ref 7–23)
CALCIUM SERPL-MCNC: 9 MG/DL — SIGNIFICANT CHANGE UP (ref 8.4–10.5)
CHLORIDE SERPL-SCNC: 102 MMOL/L — SIGNIFICANT CHANGE UP (ref 98–107)
CO2 SERPL-SCNC: 23 MMOL/L — SIGNIFICANT CHANGE UP (ref 22–31)
CREAT SERPL-MCNC: 0.93 MG/DL — SIGNIFICANT CHANGE UP (ref 0.5–1.3)
GLUCOSE SERPL-MCNC: 104 MG/DL — HIGH (ref 70–99)
HCT VFR BLD CALC: 39 % — SIGNIFICANT CHANGE UP (ref 39–50)
HGB BLD-MCNC: 13 G/DL — SIGNIFICANT CHANGE UP (ref 13–17)
MAGNESIUM SERPL-MCNC: 2 MG/DL — SIGNIFICANT CHANGE UP (ref 1.6–2.6)
MCHC RBC-ENTMCNC: 33.3 % — SIGNIFICANT CHANGE UP (ref 32–36)
MCHC RBC-ENTMCNC: 33.9 PG — SIGNIFICANT CHANGE UP (ref 27–34)
MCV RBC AUTO: 101.6 FL — HIGH (ref 80–100)
NRBC # FLD: 0 K/UL — SIGNIFICANT CHANGE UP (ref 0–0)
PLATELET # BLD AUTO: 198 K/UL — SIGNIFICANT CHANGE UP (ref 150–400)
PMV BLD: 9.4 FL — SIGNIFICANT CHANGE UP (ref 7–13)
POTASSIUM SERPL-MCNC: 3.7 MMOL/L — SIGNIFICANT CHANGE UP (ref 3.5–5.3)
POTASSIUM SERPL-SCNC: 3.7 MMOL/L — SIGNIFICANT CHANGE UP (ref 3.5–5.3)
RBC # BLD: 3.84 M/UL — LOW (ref 4.2–5.8)
RBC # FLD: 13.4 % — SIGNIFICANT CHANGE UP (ref 10.3–14.5)
SODIUM SERPL-SCNC: 135 MMOL/L — SIGNIFICANT CHANGE UP (ref 135–145)
WBC # BLD: 9.64 K/UL — SIGNIFICANT CHANGE UP (ref 3.8–10.5)
WBC # FLD AUTO: 9.64 K/UL — SIGNIFICANT CHANGE UP (ref 3.8–10.5)

## 2020-03-20 PROCEDURE — 71045 X-RAY EXAM CHEST 1 VIEW: CPT | Mod: 26

## 2020-03-20 RX ORDER — POLYETHYLENE GLYCOL 3350 17 G/17G
17 POWDER, FOR SOLUTION ORAL DAILY
Refills: 0 | Status: DISCONTINUED | OUTPATIENT
Start: 2020-03-20 | End: 2020-03-23

## 2020-03-20 RX ORDER — METOPROLOL TARTRATE 50 MG
25 TABLET ORAL EVERY 8 HOURS
Refills: 0 | Status: DISCONTINUED | OUTPATIENT
Start: 2020-03-20 | End: 2020-03-21

## 2020-03-20 RX ORDER — ASPIRIN/CALCIUM CARB/MAGNESIUM 324 MG
81 TABLET ORAL DAILY
Refills: 0 | Status: DISCONTINUED | OUTPATIENT
Start: 2020-03-20 | End: 2020-03-23

## 2020-03-20 RX ADMIN — Medication 650 MILLIGRAM(S): at 04:35

## 2020-03-20 RX ADMIN — HEPARIN SODIUM 5000 UNIT(S): 5000 INJECTION INTRAVENOUS; SUBCUTANEOUS at 17:07

## 2020-03-20 RX ADMIN — HEPARIN SODIUM 5000 UNIT(S): 5000 INJECTION INTRAVENOUS; SUBCUTANEOUS at 06:35

## 2020-03-20 RX ADMIN — Medication 650 MILLIGRAM(S): at 21:18

## 2020-03-20 RX ADMIN — Medication 650 MILLIGRAM(S): at 17:08

## 2020-03-20 RX ADMIN — POLYETHYLENE GLYCOL 3350 17 GRAM(S): 17 POWDER, FOR SOLUTION ORAL at 18:02

## 2020-03-20 RX ADMIN — Medication 25 MILLIGRAM(S): at 21:18

## 2020-03-20 RX ADMIN — TAMSULOSIN HYDROCHLORIDE 0.8 MILLIGRAM(S): 0.4 CAPSULE ORAL at 21:17

## 2020-03-20 RX ADMIN — HEPARIN SODIUM 5000 UNIT(S): 5000 INJECTION INTRAVENOUS; SUBCUTANEOUS at 21:18

## 2020-03-20 RX ADMIN — Medication 25 MILLIGRAM(S): at 13:59

## 2020-03-20 RX ADMIN — Medication 650 MILLIGRAM(S): at 11:27

## 2020-03-20 RX ADMIN — SENNA PLUS 2 TABLET(S): 8.6 TABLET ORAL at 21:18

## 2020-03-20 RX ADMIN — Medication 650 MILLIGRAM(S): at 13:21

## 2020-03-20 NOTE — PHYSICAL THERAPY INITIAL EVALUATION ADULT - GENERAL OBSERVATIONS, REHAB EVAL
Patient found supine in bed, +CT x 1 to LWS, Mandarin speaking but able to make needs known in English.

## 2020-03-20 NOTE — PHYSICAL THERAPY INITIAL EVALUATION ADULT - PERTINENT HX OF CURRENT PROBLEM, REHAB EVAL
75 year old male presents for lung surgery. Pt states h/o mass on right cheek, followed up with PCP, xray with abnormal lung finding. .

## 2020-03-20 NOTE — PROGRESS NOTE ADULT - SUBJECTIVE AND OBJECTIVE BOX
Subjective: "I feel good today, not much pain" pt OOB w minimal assist. Walking in room. Denies CP or SOb. Using IS.     Vital Signs:  Vital Signs Last 24 Hrs  T(C): 37.5 (03-20-20 @ 08:18), Max: 37.5 (03-20-20 @ 08:18)  T(F): 99.5 (03-20-20 @ 08:18), Max: 99.5 (03-20-20 @ 08:18)  HR: 63 (03-20-20 @ 08:18) (58 - 107)  BP: 116/44 (03-20-20 @ 08:18) (111/91 - 130/55)  RR: 16 (03-20-20 @ 08:18) (16 - 22)  SpO2: 97% (03-20-20 @ 08:18) (94% - 100%) on (O2)    Telemetry/Alarms:  General: WN/WD NAD  Neurology: Awake, nonfocal, ORTIZ x 4  Eyes: Scleras clear, PERRLA/ EOMI, Gross vision intact  ENT:Gross hearing intact, grossly patent pharynx, no stridor  Neck: Neck supple, trachea midline, No JVD,   Respiratory: CTA B/L, No wheezing, rales, rhonchi. Slight Dec Rt. UL  CV: RRR, S1S2, no murmurs, rubs or gallops  Abdominal: Soft, NT, ND +BS, +BM no flatus  Extremities: No edema, + peripheral pulses  Skin: No Rashes, Hematoma, Ecchymosis  Lymphatic: No Neck, axilla, groin LAD  Psych: Oriented x 3, normal affect  Incisions: Rt. VATS c/d/i.   Tubes:Rt. CT -[id number] on WS, + AL.   Relevant labs, radiology and Medications reviewed           CXR this am with worsening PTX. CT placed by to suction.              13.0   9.64  )-----------( 198      ( 20 Mar 2020 07:44 )             39.0     03-20    135  |  102  |  18  ----------------------------<  104<H>  3.7   |  23  |  0.93    Ca    9.0      20 Mar 2020 07:44  Mg     2.0     03-20        MEDICATIONS  (STANDING):  acetaminophen   Tablet .. 650 milliGRAM(s) Oral every 6 hours  heparin  Injectable 5000 Unit(s) SubCutaneous every 8 hours  metoprolol succinate ER 25 milliGRAM(s) Oral daily  senna 2 Tablet(s) Oral at bedtime  tamsulosin 0.8 milliGRAM(s) Oral at bedtime    MEDICATIONS  (PRN):  oxyCODONE    IR 5 milliGRAM(s) Oral every 4 hours PRN Moderate Pain (4 - 6)  oxyCODONE    IR 10 milliGRAM(s) Oral every 4 hours PRN Severe Pain (7 - 10)    Pertinent Physical Exam  I&O's Summary    19 Mar 2020 07:01  -  20 Mar 2020 07:00  --------------------------------------------------------  IN: 1620 mL / OUT: 1610 mL / NET: 10 mL    20 Mar 2020 07:01  -  20 Mar 2020 12:08  --------------------------------------------------------  IN: 0 mL / OUT: 350 mL / NET: -350 mL        Assessment  75y Male  w/ PAST MEDICAL & SURGICAL HISTORY:  Malignant neoplasm of bladder, unspecified: right  bronchus or lung  BPH without urinary obstruction  Hypertension  No significant past surgical history  admitted with complaints of Patient is a 75y old  Male who presents with a chief complaint of Lung surgery (19 Mar 2020 13:50)  76yo M s/p Rt. VATS RUL lobectomy on 3/18/20. Post op doing well. CT to waterseal, FU CXR w sml ptx. 3/20-Today ptx increased. CT back to suction    PLAN  Neuro: Pain management  Pulm: Encourage coughing, deep breathing and use of incentive spirometry. Wean off supplemental oxygen as able. Daily CXR.   Cardio: Monitor telemetry/alarms  GI: Tolerating diet. Continue stool softeners.  Renal: monitor urine output, supplement electrolytes as needed  Vasc: Heparin SC/SCDs for DVT prophylaxis  Heme: Stable H/H. .   ID: Off antibiotics. Stable.  Therapy: OOB/ambulate  Tubes: Monitor Chest tube output and air leak. Cont CT to suction.   Disposition: Aim to D/C to home once CT removed.   Discussed with Cardiothoracic Team at AM rounds.

## 2020-03-20 NOTE — PHYSICAL THERAPY INITIAL EVALUATION ADULT - PLANNED THERAPY INTERVENTIONS, PT EVAL
strengthening/transfer training/Patient left sitting in chair, in NAD, call bell in reach, all lines intact./gait training/bed mobility training

## 2020-03-21 PROCEDURE — 71045 X-RAY EXAM CHEST 1 VIEW: CPT | Mod: 26

## 2020-03-21 RX ORDER — POTASSIUM CHLORIDE 20 MEQ
40 PACKET (EA) ORAL ONCE
Refills: 0 | Status: COMPLETED | OUTPATIENT
Start: 2020-03-21 | End: 2020-03-21

## 2020-03-21 RX ORDER — MAGNESIUM SULFATE 500 MG/ML
1 VIAL (ML) INJECTION
Refills: 0 | Status: COMPLETED | OUTPATIENT
Start: 2020-03-21 | End: 2020-03-21

## 2020-03-21 RX ORDER — METOPROLOL TARTRATE 50 MG
25 TABLET ORAL EVERY 6 HOURS
Refills: 0 | Status: DISCONTINUED | OUTPATIENT
Start: 2020-03-21 | End: 2020-03-23

## 2020-03-21 RX ADMIN — Medication 25 MILLIGRAM(S): at 23:53

## 2020-03-21 RX ADMIN — HEPARIN SODIUM 5000 UNIT(S): 5000 INJECTION INTRAVENOUS; SUBCUTANEOUS at 22:53

## 2020-03-21 RX ADMIN — TAMSULOSIN HYDROCHLORIDE 0.8 MILLIGRAM(S): 0.4 CAPSULE ORAL at 22:53

## 2020-03-21 RX ADMIN — HEPARIN SODIUM 5000 UNIT(S): 5000 INJECTION INTRAVENOUS; SUBCUTANEOUS at 05:44

## 2020-03-21 RX ADMIN — Medication 50 GRAM(S): at 13:36

## 2020-03-21 RX ADMIN — Medication 50 GRAM(S): at 11:54

## 2020-03-21 RX ADMIN — Medication 40 MILLIEQUIVALENT(S): at 11:54

## 2020-03-21 RX ADMIN — Medication 25 MILLIGRAM(S): at 11:54

## 2020-03-21 RX ADMIN — Medication 650 MILLIGRAM(S): at 04:43

## 2020-03-21 RX ADMIN — Medication 81 MILLIGRAM(S): at 11:54

## 2020-03-21 RX ADMIN — Medication 25 MILLIGRAM(S): at 17:41

## 2020-03-21 RX ADMIN — SENNA PLUS 2 TABLET(S): 8.6 TABLET ORAL at 22:52

## 2020-03-21 RX ADMIN — Medication 25 MILLIGRAM(S): at 04:44

## 2020-03-21 RX ADMIN — HEPARIN SODIUM 5000 UNIT(S): 5000 INJECTION INTRAVENOUS; SUBCUTANEOUS at 13:36

## 2020-03-21 NOTE — PROGRESS NOTE ADULT - SUBJECTIVE AND OBJECTIVE BOX
Subjective: no acute complaints, pain controlled; chest tube with air leak  pt admits +BM postop, he is ambulating with help of nursing staff     Vital Signs:  Vital Signs Last 24 Hrs  T(C): 36.8 (03-21-20 @ 08:15), Max: 37.1 (03-21-20 @ 04:38)  T(F): 98.3 (03-21-20 @ 08:15), Max: 98.7 (03-21-20 @ 04:38)  HR: 55 (03-21-20 @ 08:15) (55 - 96)  BP: 117/41 (03-21-20 @ 08:15) (114/65 - 140/72)  RR: 16 (03-21-20 @ 08:15) (16 - 20)  SpO2: 97% (03-21-20 @ 08:15) (97% - 100%) on (O2)    Telemetry/Alarms:  General: WN/WD NAD  Neurology: Awake, nonfocal, ORTIZ x 4  Eyes: Scleras clear, PERRLA/ EOMI, Gross vision intact  ENT:Gross hearing intact, grossly patent pharynx, no stridor  Neck: Neck supple, trachea midline, No JVD,   Respiratory: CTA B/L, No wheezing, rales, rhonchi  CV: RRR, S1S2, no murmurs, rubs or gallops  Abdominal: Soft, NT, ND +BS,   Extremities: No edema, + peripheral pulses  Skin: No Rashes, Hematoma, Ecchymosis  Lymphatic: No Neck, axilla, groin LAD  Psych: Oriented x 3, normal affect  Incisions: c,d,i  Tubes:  right chest tube on suction drained 160cc/24h with expiratory air leak  Relevant labs, radiology and Medications reviewed                        13.0   9.64  )-----------( 198      ( 20 Mar 2020 07:44 )             39.0     03-20    135  |  102  |  18  ----------------------------<  104<H>  3.7   |  23  |  0.93    Ca    9.0      20 Mar 2020 07:44  Mg     2.0     03-20        MEDICATIONS  (STANDING):  aspirin  chewable 81 milliGRAM(s) Oral daily  heparin  Injectable 5000 Unit(s) SubCutaneous every 8 hours  metoprolol tartrate 25 milliGRAM(s) Oral every 8 hours  polyethylene glycol 3350 17 Gram(s) Oral daily  potassium chloride    Tablet ER 40 milliEquivalent(s) Oral once  senna 2 Tablet(s) Oral at bedtime  tamsulosin 0.8 milliGRAM(s) Oral at bedtime    MEDICATIONS  (PRN):  bisacodyl Suppository 10 milliGRAM(s) Rectal daily PRN Constipation  oxyCODONE    IR 5 milliGRAM(s) Oral every 4 hours PRN Moderate Pain (4 - 6)  oxyCODONE    IR 10 milliGRAM(s) Oral every 4 hours PRN Severe Pain (7 - 10)    Pertinent Physical Exam  I&O's Summary    20 Mar 2020 07:01  -  21 Mar 2020 07:00  --------------------------------------------------------  IN: 600 mL / OUT: 1010 mL / NET: -410 mL    21 Mar 2020 07:01  -  21 Mar 2020 10:11  --------------------------------------------------------  IN: 0 mL / OUT: 350 mL / NET: -350 mL      Assessment  75y Male  w/ PAST MEDICAL & SURGICAL HISTORY:  Malignant neoplasm of bladder, unspecified: right  bronchus or lung  BPH without urinary obstruction  Hypertension  No significant past surgical history  admitted with complaints of Patient is a 75y old  Male who presents with a chief complaint of Lung surgery (19 Mar 2020 13:50)  76yo M s/p Rt. VATS RUL lobectomy on 3/18/20. Post op doing well. CT to waterseal, FU CXR w sml ptx. 3/20-Today ptx increased. CT back to suction    PLAN  Neuro: Pain management  Pulm: Encourage coughing, deep breathing and use of incentive spirometry. Wean off supplemental oxygen as able. Daily CXR.   Cardio: Monitor telemetry/alarms  GI: Tolerating diet. Continue stool softeners.  Renal: monitor urine output, supplement electrolytes as needed  Vasc: Heparin SC/SCDs for DVT prophylaxis  Heme: Stable H/H. .   ID: Off antibiotics. Stable.  Therapy: OOB/ambulate  Tubes: Monitor Chest tube output and air leak. Cont CT to suction.   Disposition: Aim to D/C to home once CT removed.   Discussed with Cardiothoracic Team at AM rounds.

## 2020-03-22 PROCEDURE — 71045 X-RAY EXAM CHEST 1 VIEW: CPT | Mod: 26

## 2020-03-22 PROCEDURE — 71045 X-RAY EXAM CHEST 1 VIEW: CPT | Mod: 26,77

## 2020-03-22 RX ADMIN — Medication 25 MILLIGRAM(S): at 12:43

## 2020-03-22 RX ADMIN — POLYETHYLENE GLYCOL 3350 17 GRAM(S): 17 POWDER, FOR SOLUTION ORAL at 12:44

## 2020-03-22 RX ADMIN — HEPARIN SODIUM 5000 UNIT(S): 5000 INJECTION INTRAVENOUS; SUBCUTANEOUS at 22:34

## 2020-03-22 RX ADMIN — Medication 25 MILLIGRAM(S): at 05:06

## 2020-03-22 RX ADMIN — Medication 81 MILLIGRAM(S): at 12:43

## 2020-03-22 RX ADMIN — Medication 25 MILLIGRAM(S): at 17:44

## 2020-03-22 RX ADMIN — HEPARIN SODIUM 5000 UNIT(S): 5000 INJECTION INTRAVENOUS; SUBCUTANEOUS at 12:43

## 2020-03-22 RX ADMIN — TAMSULOSIN HYDROCHLORIDE 0.8 MILLIGRAM(S): 0.4 CAPSULE ORAL at 22:34

## 2020-03-22 RX ADMIN — HEPARIN SODIUM 5000 UNIT(S): 5000 INJECTION INTRAVENOUS; SUBCUTANEOUS at 05:06

## 2020-03-22 NOTE — PROGRESS NOTE ADULT - SUBJECTIVE AND OBJECTIVE BOX
Subjective: "I feel ok. I've been moving around better" pt denies CP or SOB. Some pain at CT site w coughing. Using IS to 750. Amb in room w minimal assist. Tachy on tele with ambulation.     Vital Signs:  Vital Signs Last 24 Hrs  T(C): 37.1 (03-22-20 @ 08:21), Max: 37.2 (03-21-20 @ 12:00)  T(F): 98.7 (03-22-20 @ 08:21), Max: 99 (03-21-20 @ 12:00)  HR: 97 (03-22-20 @ 08:21) (55 - 103)  BP: 108/61 (03-22-20 @ 08:21) (108/61 - 139/53)  RR: 16 (03-22-20 @ 08:21) (16 - 18)  SpO2: 98% (03-22-20 @ 08:21) (96% - 100%) on (O2)    Telemetry/Alarms: Sr/ST 70s-110s w frequent PACs.   General: WN/WD NAD  Neurology: Awake, nonfocal, ORTIZ x 4  Eyes: Scleras clear, PERRLA/ EOMI, Gross vision intact  ENT:Gross hearing intact, grossly patent pharynx, no stridor  Neck: Neck supple, trachea midline, No JVD,   Respiratory: CTA B/L, No wheezing, rales, rhonchi. Dec BS rt. mid to base.   CV: RRR, S1S2, no murmurs, rubs or gallops  Abdominal: Soft, NT, ND +BS, + BM yesterday. Voiding.    Extremities: No edema, + peripheral pulses  Skin: No Rashes, Hematoma, Ecchymosis  Lymphatic: No Neck, axilla, groin LAD  Psych: Oriented x 3, normal affect  Incisions: Rt. VATs C/d/i.   Tubes: Rt. CT- 120cc/24hrs on suction. + expir airleak.   Relevant labs, radiology and Medications reviewed  CXR - appears to be stable, sml Rt. ptx.           MEDICATIONS  (STANDING):  aspirin  chewable 81 milliGRAM(s) Oral daily  heparin  Injectable 5000 Unit(s) SubCutaneous every 8 hours  metoprolol tartrate 25 milliGRAM(s) Oral every 6 hours  polyethylene glycol 3350 17 Gram(s) Oral daily  senna 2 Tablet(s) Oral at bedtime  tamsulosin 0.8 milliGRAM(s) Oral at bedtime    MEDICATIONS  (PRN):  bisacodyl Suppository 10 milliGRAM(s) Rectal daily PRN Constipation  oxyCODONE    IR 5 milliGRAM(s) Oral every 4 hours PRN Moderate Pain (4 - 6)  oxyCODONE    IR 10 milliGRAM(s) Oral every 4 hours PRN Severe Pain (7 - 10)    Pertinent Physical Exam  I&O's Summary    21 Mar 2020 07:01  -  22 Mar 2020 07:00  --------------------------------------------------------  IN: 820 mL / OUT: 2725 mL / NET: -1905 mL        Assessment  75y Male  w/ PAST MEDICAL & SURGICAL HISTORY:  Malignant neoplasm of bladder, unspecified: right  bronchus or lung  BPH without urinary obstruction  Hypertension  No significant past surgical history  admitted with complaints of Patient is a 75y old  Male who presents with a chief complaint of Lung surgery (20 Mar 2020 12:05)  76yo M s/p Rt. VATS RUL lobectomy on 3/18/20. Post op doing well. CT to waterseal, FU CXR w sml ptx. 3/20-Today ptx increased. CT back to suction. 3/20-3/21- Intermittent ST w PACs, beta blockers titrated up.   3/22-CXR stable, will do waterseal trial again.     PLAN  Neuro: Pain management  Pulm: Encourage coughing, deep breathing and use of incentive spirometry. Wean off supplemental oxygen as able. Daily CXR.   Cardio: Monitor telemetry/alarms. HR improved, will cont to monitor. Will titrate BB as needed. Cont ASA  GI: Tolerating diet. Continue stool softeners.  Renal: monitor urine output, supplement electrolytes as needed  Vasc: Heparin SC/SCDs for DVT prophylaxis  Heme: Stable H/H. .   ID: Off antibiotics. Stable.  Therapy: OOB/ambulate  Tubes: Monitor Chest tube output and air leak. Waterseal trial today, will rpt CXR in 3-4  hours.   Disposition: Aim to D/C to home once CT removed.   Discussed with Cardiothoracic Team at AM rounds.

## 2020-03-23 ENCOUNTER — TRANSCRIPTION ENCOUNTER (OUTPATIENT)
Age: 75
End: 2020-03-23

## 2020-03-23 VITALS
SYSTOLIC BLOOD PRESSURE: 113 MMHG | RESPIRATION RATE: 17 BRPM | HEART RATE: 78 BPM | TEMPERATURE: 99 F | DIASTOLIC BLOOD PRESSURE: 48 MMHG | OXYGEN SATURATION: 100 %

## 2020-03-23 LAB
ANION GAP SERPL CALC-SCNC: 11 MMO/L — SIGNIFICANT CHANGE UP (ref 7–14)
BUN SERPL-MCNC: 14 MG/DL — SIGNIFICANT CHANGE UP (ref 7–23)
CALCIUM SERPL-MCNC: 9.1 MG/DL — SIGNIFICANT CHANGE UP (ref 8.4–10.5)
CHLORIDE SERPL-SCNC: 102 MMOL/L — SIGNIFICANT CHANGE UP (ref 98–107)
CO2 SERPL-SCNC: 21 MMOL/L — LOW (ref 22–31)
CREAT SERPL-MCNC: 0.81 MG/DL — SIGNIFICANT CHANGE UP (ref 0.5–1.3)
GLUCOSE SERPL-MCNC: 108 MG/DL — HIGH (ref 70–99)
POTASSIUM SERPL-MCNC: 4.2 MMOL/L — SIGNIFICANT CHANGE UP (ref 3.5–5.3)
POTASSIUM SERPL-SCNC: 4.2 MMOL/L — SIGNIFICANT CHANGE UP (ref 3.5–5.3)
SODIUM SERPL-SCNC: 134 MMOL/L — LOW (ref 135–145)

## 2020-03-23 PROCEDURE — 71045 X-RAY EXAM CHEST 1 VIEW: CPT | Mod: 26,77

## 2020-03-23 PROCEDURE — 71045 X-RAY EXAM CHEST 1 VIEW: CPT | Mod: 26

## 2020-03-23 RX ORDER — ACETAMINOPHEN 500 MG
2 TABLET ORAL
Qty: 0 | Refills: 0 | DISCHARGE

## 2020-03-23 RX ORDER — METOPROLOL TARTRATE 50 MG
50 TABLET ORAL
Refills: 0 | Status: DISCONTINUED | OUTPATIENT
Start: 2020-03-23 | End: 2020-03-23

## 2020-03-23 RX ORDER — METOPROLOL TARTRATE 50 MG
1 TABLET ORAL
Qty: 60 | Refills: 0
Start: 2020-03-23 | End: 2020-04-21

## 2020-03-23 RX ORDER — OXYCODONE HYDROCHLORIDE 5 MG/1
2 TABLET ORAL
Qty: 84 | Refills: 0
Start: 2020-03-23 | End: 2020-03-29

## 2020-03-23 RX ORDER — METOPROLOL TARTRATE 50 MG
1 TABLET ORAL
Qty: 0 | Refills: 0 | DISCHARGE

## 2020-03-23 RX ORDER — SENNA PLUS 8.6 MG/1
2 TABLET ORAL
Qty: 0 | Refills: 0 | DISCHARGE
Start: 2020-03-23

## 2020-03-23 RX ORDER — POLYETHYLENE GLYCOL 3350 17 G/17G
17 POWDER, FOR SOLUTION ORAL
Qty: 0 | Refills: 0 | DISCHARGE
Start: 2020-03-23

## 2020-03-23 RX ADMIN — Medication 25 MILLIGRAM(S): at 00:17

## 2020-03-23 RX ADMIN — Medication 25 MILLIGRAM(S): at 05:07

## 2020-03-23 RX ADMIN — HEPARIN SODIUM 5000 UNIT(S): 5000 INJECTION INTRAVENOUS; SUBCUTANEOUS at 05:07

## 2020-03-23 RX ADMIN — Medication 81 MILLIGRAM(S): at 13:06

## 2020-03-23 RX ADMIN — Medication 25 MILLIGRAM(S): at 13:05

## 2020-03-23 RX ADMIN — Medication 50 MILLIGRAM(S): at 17:41

## 2020-03-23 RX ADMIN — HEPARIN SODIUM 5000 UNIT(S): 5000 INJECTION INTRAVENOUS; SUBCUTANEOUS at 13:06

## 2020-03-23 NOTE — DISCHARGE NOTE NURSING/CASE MANAGEMENT/SOCIAL WORK - NSDCPNINST_GEN_ALL_CORE
Pt remained hemodynamically stable.  Pt denies any pain or shortness of breath. Pt being discharged to home. Pt understands all discharge instructions and medication.  Pt. pneumostat education provided. Pt. surgery site clean, dry, and intact.

## 2020-03-23 NOTE — DISCHARGE NOTE NURSING/CASE MANAGEMENT/SOCIAL WORK - NSDCPEEMAIL_GEN_ALL_CORE
[facility] for Tobacco Control email [email or web address]

## 2020-03-23 NOTE — DISCHARGE NOTE NURSING/CASE MANAGEMENT/SOCIAL WORK - NSDCFUADDAPPT_GEN_ALL_CORE_FT
Follow up with Dr. Ann this Thursday in the office. You will be called with an appointment time. Call the office with any questions.  [phone]   Chest x-ray to be done prior to appointment with Dr. Ann    Follow up with primary care provider or Cardiologist in one week to discuss your elevated heart rate and change in medications.

## 2020-03-23 NOTE — DISCHARGE NOTE NURSING/CASE MANAGEMENT/SOCIAL WORK - PATIENT PORTAL LINK FT
You can access the FollowMyHealth Patient Portal offered by [facility] by registering at the following website: [email or web address]. By joining [employer]’s FollowMyHealth portal, you will also be able to view your health information using other applications (apps) compatible with our system.

## 2020-03-23 NOTE — DISCHARGE NOTE NURSING/CASE MANAGEMENT/SOCIAL WORK - NSDCPEWEB_GEN_ALL_CORE
[place] website --- [email or web address] for Tobacco Control website --- [email or web address]

## 2020-03-23 NOTE — PROGRESS NOTE ADULT - SUBJECTIVE AND OBJECTIVE BOX
Subjective: "I feel better when I'm sitting in the chair. My pain is worse in the bed" Pt denies CP or SOB. AMb in hallway. ST on tele w ambulation at times.     Vital Signs:  Vital Signs Last 24 Hrs  T(C): 37.2 (03-23-20 @ 08:13), Max: 37.2 (03-23-20 @ 08:13)  T(F): 98.9 (03-23-20 @ 08:13), Max: 98.9 (03-23-20 @ 08:13)  HR: 95 (03-23-20 @ 08:13) (63 - 110)  BP: 125/95 (03-23-20 @ 08:13) (103/48 - 128/56)  RR: 18 (03-23-20 @ 08:13) (17 - 18)  SpO2: 95% (03-23-20 @ 08:13) (95% - 99%) on (O2)    Telemetry/Alarms: tele SR/ST to 120s w PACs  General: WN/WD NAD  Neurology: Awake, nonfocal, ORTIZ x 4  Eyes: Scleras clear, PERRLA/ EOMI, Gross vision intact  ENT:Gross hearing intact, grossly patent pharynx, no stridor  Neck: Neck supple, trachea midline, No JVD,   Respiratory: Slight dec BS Rt. mid to base  CV: RRR, S1S2, no murmurs, rubs or gallops  Abdominal: Soft, NT, ND +BS, +BM yesterday  Extremities: No edema, + peripheral pulses  Skin: No Rashes, Hematoma, Ecchymosis  Lymphatic: No Neck, axilla, groin LAD  Psych: Oriented x 3, normal affect  Incisions: VATS c/d/i  Tubes: Rt. CT 100cc/24hrs on Ws, questionable AL, + tidaling. CT clamped this am at 8:30am per Dr. Ann.   Relevant labs, radiology and Medications reviewed  CXR this am still with stable PTX>   03-23    134<L>  |  102  |  14  ----------------------------<  108<H>  4.2   |  21<L>  |  0.81    Ca    9.1      23 Mar 2020 05:20        MEDICATIONS  (STANDING):  aspirin  chewable 81 milliGRAM(s) Oral daily  heparin  Injectable 5000 Unit(s) SubCutaneous every 8 hours  metoprolol tartrate 25 milliGRAM(s) Oral every 6 hours  polyethylene glycol 3350 17 Gram(s) Oral daily  senna 2 Tablet(s) Oral at bedtime  tamsulosin 0.8 milliGRAM(s) Oral at bedtime    MEDICATIONS  (PRN):  bisacodyl Suppository 10 milliGRAM(s) Rectal daily PRN Constipation  oxyCODONE    IR 5 milliGRAM(s) Oral every 4 hours PRN Moderate Pain (4 - 6)  oxyCODONE    IR 10 milliGRAM(s) Oral every 4 hours PRN Severe Pain (7 - 10)    Pertinent Physical Exam  I&O's Summary    22 Mar 2020 07:01  -  23 Mar 2020 07:00  --------------------------------------------------------  IN: 0 mL / OUT: 1580 mL / NET: -1580 mL    23 Mar 2020 07:01  -  23 Mar 2020 09:23  --------------------------------------------------------  IN: 0 mL / OUT: 120 mL / NET: -120 mL        Assessment  75y Male  w/ PAST MEDICAL & SURGICAL HISTORY:  Malignant neoplasm of bladder, unspecified: right  bronchus or lung  BPH without urinary obstruction  Hypertension  No significant past surgical history  admitted with complaints of Patient is a 75y old  Male who presents with a chief complaint of Lung surgery (22 Mar 2020 09:13)  74yo M s/p Rt. VATS RUL lobectomy on 3/18/20. Post op doing well. CT to waterseal, FU CXR w sml ptx. 3/20-Today ptx increased. CT back to suction. 3/20-3/21- Intermittent ST w PACs, beta blockers titrated up.   3/22-CXR stable, will do waterseal trial again. 3/23- Passed waterseal trial. Will do clamp trial today.        PLAN  Neuro: Pain management  Pulm: Encourage coughing, deep breathing and use of incentive spirometry. Wean off supplemental oxygen as able. Daily CXR.   Cardio: Monitor telemetry/alarms. Continue BB. Cont ASA  GI: Tolerating diet. Continue stool softeners.  Renal: monitor urine output, supplement electrolytes as needed  Vasc: Heparin SC/SCDs for DVT prophylaxis  Heme: Stable H/H. .   ID: Off antibiotics. Stable.  Therapy: OOB/ambulate  Tubes: Monitor Chest tube output. Will rpt CXR in 3-4 hrs with CT clamped. If passes clamping trial will d/c CT and possible d/c home. If fails clamping trial, will attach pneumostat and d/c pt to home w VNS per Dr. Ann.   Disposition: Aim to D/C to home in next 24-48hrs.   Discussed with Cardiothoracic Team at AM rounds.

## 2020-03-24 PROBLEM — N40.0 BENIGN PROSTATIC HYPERPLASIA WITHOUT LOWER URINARY TRACT SYMPTOMS: Chronic | Status: ACTIVE | Noted: 2020-03-10

## 2020-03-24 PROBLEM — C67.9 MALIGNANT NEOPLASM OF BLADDER, UNSPECIFIED: Chronic | Status: ACTIVE | Noted: 2020-03-10

## 2020-03-24 PROBLEM — I10 ESSENTIAL (PRIMARY) HYPERTENSION: Chronic | Status: ACTIVE | Noted: 2020-03-10

## 2020-03-26 ENCOUNTER — APPOINTMENT (OUTPATIENT)
Dept: RADIOLOGY | Facility: HOSPITAL | Age: 75
End: 2020-03-26

## 2020-03-26 ENCOUNTER — OUTPATIENT (OUTPATIENT)
Dept: OUTPATIENT SERVICES | Facility: HOSPITAL | Age: 75
LOS: 1 days | End: 2020-03-26
Payer: MEDICARE

## 2020-03-26 ENCOUNTER — APPOINTMENT (OUTPATIENT)
Dept: THORACIC SURGERY | Facility: CLINIC | Age: 75
End: 2020-03-26
Payer: MEDICARE

## 2020-03-26 ENCOUNTER — RESULT REVIEW (OUTPATIENT)
Age: 75
End: 2020-03-26

## 2020-03-26 VITALS
HEART RATE: 92 BPM | OXYGEN SATURATION: 96 % | DIASTOLIC BLOOD PRESSURE: 68 MMHG | SYSTOLIC BLOOD PRESSURE: 115 MMHG | RESPIRATION RATE: 17 BRPM

## 2020-03-26 DIAGNOSIS — C34.91 MALIGNANT NEOPLASM OF UNSPECIFIED PART OF RIGHT BRONCHUS OR LUNG: ICD-10-CM

## 2020-03-26 DIAGNOSIS — K59.00 CONSTIPATION, UNSPECIFIED: ICD-10-CM

## 2020-03-26 PROCEDURE — 71046 X-RAY EXAM CHEST 2 VIEWS: CPT | Mod: 26,77

## 2020-03-26 PROCEDURE — 32552 REMOVE LUNG CATHETER: CPT | Mod: 78

## 2020-03-26 PROCEDURE — 71046 X-RAY EXAM CHEST 2 VIEWS: CPT | Mod: 26

## 2020-03-26 PROCEDURE — 99024 POSTOP FOLLOW-UP VISIT: CPT

## 2020-03-26 RX ORDER — LINACLOTIDE 145 UG/1
145 CAPSULE, GELATIN COATED ORAL AT BEDTIME
Qty: 30 | Refills: 0 | Status: ACTIVE | COMMUNITY
Start: 2020-03-26 | End: 1900-01-01

## 2020-03-26 RX ORDER — PROMETHAZINE HYDROCHLORIDE 6.25 MG/5ML
6.25 SOLUTION ORAL
Qty: 1 | Refills: 0 | Status: ACTIVE | COMMUNITY
Start: 2020-03-26 | End: 1900-01-01

## 2020-04-16 ENCOUNTER — RESULT REVIEW (OUTPATIENT)
Age: 75
End: 2020-04-16

## 2020-04-16 ENCOUNTER — OUTPATIENT (OUTPATIENT)
Dept: OUTPATIENT SERVICES | Facility: HOSPITAL | Age: 75
LOS: 1 days | End: 2020-04-16
Payer: MEDICARE

## 2020-04-16 ENCOUNTER — APPOINTMENT (OUTPATIENT)
Dept: THORACIC SURGERY | Facility: CLINIC | Age: 75
End: 2020-04-16
Payer: MEDICARE

## 2020-04-16 ENCOUNTER — APPOINTMENT (OUTPATIENT)
Dept: RADIOLOGY | Facility: HOSPITAL | Age: 75
End: 2020-04-16

## 2020-04-16 VITALS
DIASTOLIC BLOOD PRESSURE: 79 MMHG | OXYGEN SATURATION: 96 % | BODY MASS INDEX: 23.76 KG/M2 | SYSTOLIC BLOOD PRESSURE: 138 MMHG | HEART RATE: 107 BPM | RESPIRATION RATE: 16 BRPM | WEIGHT: 132 LBS

## 2020-04-16 DIAGNOSIS — C34.91 MALIGNANT NEOPLASM OF UNSPECIFIED PART OF RIGHT BRONCHUS OR LUNG: ICD-10-CM

## 2020-04-16 PROCEDURE — 99024 POSTOP FOLLOW-UP VISIT: CPT

## 2020-04-16 PROCEDURE — 71046 X-RAY EXAM CHEST 2 VIEWS: CPT | Mod: 26

## 2020-04-16 RX ORDER — PROMETHAZINE HYDROCHLORIDE 6.25 MG/5ML
6.25 SOLUTION ORAL
Qty: 1 | Refills: 0 | Status: ACTIVE | COMMUNITY
Start: 2020-04-16 | End: 1900-01-01

## 2020-08-30 ENCOUNTER — RX RENEWAL (OUTPATIENT)
Age: 75
End: 2020-08-30

## 2020-09-17 ENCOUNTER — APPOINTMENT (OUTPATIENT)
Dept: THORACIC SURGERY | Facility: CLINIC | Age: 75
End: 2020-09-17
Payer: MEDICARE

## 2020-09-17 PROCEDURE — 99442: CPT

## 2020-09-18 NOTE — CONSULT LETTER
[Dear  ___] : Dear  [unfilled], [Consult Letter:] : I had the pleasure of evaluating your patient, [unfilled]. [( Thank you for referring [unfilled] for consultation for _____ )] : Thank you for referring [unfilled] for consultation for [unfilled] [Please see my note below.] : Please see my note below. [Consult Closing:] : Thank you very much for allowing me to participate in the care of this patient.  If you have any questions, please do not hesitate to contact me. [Sincerely,] : Sincerely, [DrRosenda  ___] : Dr. STEWART [DrRosenda ___] : Dr. STEWART [FreeTextEntry2] : Ravi Samuel MD (Pulm/Referring)\par Jethro Abernathy MD (PCP)\par Edvin Vera MD (ENT) \par German Hanna MD (Cardiology)  [FreeTextEntry3] : Fahad Ann MD, MPH \par System Director of Thoracic Surgery \par Director of Comprehensive Lung and Foregut [place] \par Professor Cardiovascular & Thoracic Surgery  \par [facility] [employer] at [facility]\par

## 2020-09-18 NOTE — HISTORY OF PRESENT ILLNESS
[Home] : at home, [unfilled] , at the time of the visit. [Medical Office: ([facility])___] : at the medical office located in  [Verbal consent obtained from patient] : the patient, [unfilled] [FreeTextEntry1] : Mr. JOSH ALLEN, 75 year old male, former smoker, w/ hx of HTN, HLD, BPH, who was seen by Dr. Edvin Vera (ENT) due to " neck swelling", CT neck revealed area of atelectasis or infiltrate at the posterior right lung apex. \par \par CT Chest on 1/21/2020:\par - 2.9 x 2cm subsolid, predominantly groundglass opacity in the RUL (3:23)\par - a 1.6cm Rt apical ggo (3;17)\par \par FNA of RUL on 2/10/2020 at [facility]/. Path revealed RUL AdenoCA, +TTF-1. PD-L1, ALK, and ROS1 negative. \par \par PFTs on 2/18/2020: %, FEV1 131%, DLCO 102%.\par \par Brain MRI on 2/21/2020:\par - CHUY\par \par PET/CT on 2/27/2020:\par - few stable active nodes involving both sides of the neck: a 1.5cm SUV=7.7 Rt submandibular node (image 28); an 8mm SUV=4.5 Lt submandibular node (image 25)\par - a stable 1.8cm nodule in the subcutaneous fat in the Rt maxilla (image 19) w/out activity, likely benign\par - a 2.8cm SUV=3.5 mixed solid and groundglass opacity in the Rt lung apex (image 47), suspicious for malignancy\par - a 2.5cm SUV=2.2 Rt lung apex ggo (image 43), suspicious for malignancy\par - mild fatty infiltration of the liver\par \par Of note, as per patient, he was seen by ENT and had negative laryngoscopy before. \par \par Now 6 mon s/p Rt VATS Robotic-assisted, RULobectomy, MLND on 3/18/2020. 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 Two additional AdenoCA in-situ lesions, 6mm each, are present. \par \par Post-op complicated with persistent air-leak, discharged home with PneumoSTAT on 3/23/2020. Right PneumoSTAT removed in office on 03/26/2020. \par \par CT Chest on 9/14/20:\par - post-op changes\par - CHUY\par \par Pt presents today for follow up via telephonic visit. Pt reports doing well, denies SOB, cough or CP.\par

## 2020-09-18 NOTE — ASSESSMENT
[FreeTextEntry1] : Mr. JOSH ALLEN, 75 year old male, former smoker, w/ hx of HTN, HLD, BPH, who was seen by Dr. Edvin Vera (ENT) due to " neck swelling", CT neck revealed area of atelectasis or infiltrate at the posterior right lung apex. \par \par CT Chest on 1/21/2020:\par - 2.9 x 2cm subsolid, predominantly groundglass opacity in the RUL (3:23)\par - a 1.6cm Rt apical ggo (3;17)\par \par FNA of RUL on 2/10/2020 at [facility]/. Path revealed RUL AdenoCA, +TTF-1. PD-L1, ALK, and ROS1 negative. \par \par PFTs on 2/18/2020: %, FEV1 131%, DLCO 102%.\par \par Brain MRI on 2/21/2020:\par - CHUY\par \par PET/CT on 2/27/2020:\par - few stable active nodes involving both sides of the neck: a 1.5cm SUV=7.7 Rt submandibular node (image 28); an 8mm SUV=4.5 Lt submandibular node (image 25)\par - a stable 1.8cm nodule in the subcutaneous fat in the Rt maxilla (image 19) w/out activity, likely benign\par - a 2.8cm SUV=3.5 mixed solid and groundglass opacity in the Rt lung apex (image 47), suspicious for malignancy\par - a 2.5cm SUV=2.2 Rt lung apex ggo (image 43), suspicious for malignancy\par - mild fatty infiltration of the liver\par \par Of note, as per patient, he was seen by ENT and had negative laryngoscopy before. \par \par Now 6 mon s/p Rt VATS Robotic-assisted, RULobectomy, MLND on 3/18/2020. 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 Two additional AdenoCA in-situ lesions, 6mm each, are present. \par \par Post-op complicated with persistent air-leak, discharged home with PneumoSTAT on 3/23/2020. Right PneumoSTAT removed in office on 03/26/2020. \par \par CT Chest on 9/14/20:\par - post-op changes\par - CHUY\par \par I have reviewed the patient's medical records and diagnostic images at time of this office consultation and have made the following recommendation:\par 1. CT scan showed no evidence of recurrence, recommended patient to return to office in 6 mons with CT Chest w/o contrast.\par I spent 15 mins speaking with pt on the phone regarding above results and plan.\par \par \par I personally performed the services described in the documentation, reviewed the documentation recorded by the scribe in my presence and it accurately and completely records my words and actions. \par \par I, Sofía Keith NP, am scribing for and the presence of Dr. Fahad Ann the following sections, HISTORY OF PRESENT ILLNESS, PAST MEDICAL/FAMILY/SOCIAL HISTORY; REVIEW OF SYSTEMS; VITAL SIGNS; PHYSICAL EXAM; DISPOSITION.\par

## 2021-03-18 ENCOUNTER — RX RENEWAL (OUTPATIENT)
Age: 76
End: 2021-03-18

## 2021-03-25 ENCOUNTER — APPOINTMENT (OUTPATIENT)
Dept: THORACIC SURGERY | Facility: CLINIC | Age: 76
End: 2021-03-25
Payer: MEDICARE

## 2021-03-25 PROCEDURE — 99442: CPT

## 2021-03-29 NOTE — CONSULT LETTER
[Dear  ___] : Dear  [unfilled], [Courtesy Letter:] : I had the pleasure of seeing your patient, [unfilled], in my office today. [Please see my note below.] : Please see my note below. [Consult Closing:] : Thank you very much for allowing me to participate in the care of this patient.  If you have any questions, please do not hesitate to contact me. [Sincerely,] : Sincerely, [DrRosenda  ___] : Dr. STEWART [DrRosenda ___] : Dr. STEWART [FreeTextEntry2] : Ravi Samuel MD (Pulm/Referring)\par Jethro Abernathy MD (PCP)\par Edvin Vera MD (ENT) \par German Hanna MD (Cardiology)  [FreeTextEntry3] : Fahad Ann MD, MPH \par System Director of Thoracic Surgery \par Director of Comprehensive Lung and Foregut [place] \par Professor Cardiovascular & Thoracic Surgery  \par [facility] [employer] at [facility]\par \par [facility]\par [address]\par Oncology [address]\[place] [address]\par Tel: [phone]\par Fax: [phone]\par

## 2021-03-29 NOTE — DATA REVIEWED
[FreeTextEntry1] : CT Chest on 3/19/21:\par - post-op changes: nodular thickening on the oblique fissure up to 3mm, c/w post-op scarring (3:17)\par - CHUY

## 2021-03-29 NOTE — ASSESSMENT
[FreeTextEntry1] : Mr. JOSH ALLEN, 76 year old male, former smoker, w/ hx of HTN, HLD, BPH, and Lung CA.\par \par Now 1 yr s/p Rt VATS Robotic-assisted, RULobectomy, MLND on 3/18/2020. 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 Two additional AdenoCA in-situ lesions, 6mm each, are present. \par \par \par I have independently reviewed patient's CT on 3/19/21 and have indicated my interpretations below:\par 1. CT scan showed no evidence of recurrence, recommended patient to return to office in 6mo w/ CT Chest w/o contrast.\par \par \par I have spent 20 minutes on the phone with patient explaining all of the above.\par \par \par I personally performed the services described in the documentation, reviewed the documentation recorded by the scribe in my presence and it accurately and completely records my words and actions.\par \par I, Nasim Arnold NP, am scribing for and the presence of ZEINAB Palomino, the following sections HISTORY OF PRESENT ILLNESS, PAST MEDICAL/FAMILY/SOCIAL HISTORY; REVIEW OF SYSTEMS; VITAL SIGNS; PHYSICAL EXAM; DISPOSITION.\par \par

## 2021-03-29 NOTE — HISTORY OF PRESENT ILLNESS
[Home] : at home, [unfilled] , at the time of the visit. [Medical Office: ([facility])___] : at the medical office located in  [Verbal consent obtained from patient] : the patient, [unfilled] [FreeTextEntry1] : \par Mr. JOSH ALLEN, 76 year old male, former smoker, w/ hx of HTN, HLD, BPH, who was seen by Dr. Edvin Vera (ENT) due to "neck swelling", CT neck revealed area of atelectasis or infiltrate at the posterior right lung apex. \par \par CT Chest on 1/21/2020:\par - 2.9 x 2cm subsolid, predominantly groundglass opacity in the RUL (3:23)\par - a 1.6cm Rt apical ggo (3;17)\par \par FNA of RUL on 2/10/2020 at [facility]/. Path revealed RUL AdenoCA, +TTF-1. PD-L1, ALK, and ROS1 negative. \par \par PFTs on 2/18/2020: %, FEV1 131%, DLCO 102%.\par \par Brain MRI on 2/21/2020:\par - CHUY\par \par PET/CT on 2/27/2020:\par - few stable active nodes involving both sides of the neck: a 1.5cm SUV=7.7 Rt submandibular node (image 28); an 8mm SUV=4.5 Lt submandibular node (image 25)\par - a stable 1.8cm nodule in the subcutaneous fat in the Rt maxilla (image 19) w/out activity, likely benign\par - a 2.8cm SUV=3.5 mixed solid and groundglass opacity in the Rt lung apex (image 47), suspicious for malignancy\par - a 2.5cm SUV=2.2 Rt lung apex ggo (image 43), suspicious for malignancy\par - mild fatty infiltration of the liver\par \par Of note, as per patient, he was seen by ENT and had negative laryngoscopy before. \par \par Now 1 yr s/p Rt VATS Robotic-assisted, RULobectomy, MLND on 3/18/2020. 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 Two additional AdenoCA in-situ lesions, 6mm each, are present. \par Post-op complicated with persistent air-leak, discharged home with PneumoSTAT on 3/23/2020. Right PneumoSTAT removed in office on 03/26/2020. \par \par CT Chest on 9/14/20:\par - post-op changes\par - CHUY\par \par CT Chest on 3/19/21:\par - post-op changes: nodular thickening on the oblique fissure up to 3mm, c/w post-op scarring (3:17)\par - CHUY\par \par Patient is followed today via Telephonic visit. Denies SOB, CP, cough.

## 2021-05-13 ENCOUNTER — RX RENEWAL (OUTPATIENT)
Age: 76
End: 2021-05-13

## 2021-05-13 RX ORDER — METOPROLOL SUCCINATE 25 MG/1
25 TABLET, EXTENDED RELEASE ORAL DAILY
Qty: 30 | Refills: 0 | Status: ACTIVE | COMMUNITY
Start: 2020-03-06 | End: 1900-01-01

## 2021-09-30 ENCOUNTER — APPOINTMENT (OUTPATIENT)
Dept: THORACIC SURGERY | Facility: CLINIC | Age: 76
End: 2021-09-30
Payer: MEDICARE

## 2021-09-30 VITALS
TEMPERATURE: 97.8 F | BODY MASS INDEX: 23.76 KG/M2 | DIASTOLIC BLOOD PRESSURE: 78 MMHG | OXYGEN SATURATION: 98 % | HEART RATE: 86 BPM | RESPIRATION RATE: 18 BRPM | WEIGHT: 132 LBS | SYSTOLIC BLOOD PRESSURE: 146 MMHG

## 2021-09-30 PROCEDURE — 99214 OFFICE O/P EST MOD 30 MIN: CPT

## 2021-10-01 NOTE — HISTORY OF PRESENT ILLNESS
[FreeTextEntry1] : Mr. JOSH ALLEN, 76 year old male, former smoker, w/ hx of HTN, HLD, BPH, who was seen by Dr. Edvin Vera (ENT) due to "neck swelling", CT neck revealed area of atelectasis or infiltrate at the posterior right lung apex. \par \par CT Chest on 1/21/2020:\par - 2.9 x 2cm subsolid, predominantly groundglass opacity in the RUL (3:23)\par - a 1.6cm Rt apical ggo (3;17)\par \par FNA of RUL on 2/10/2020 at [facility]/. Path revealed RUL AdenoCA, +TTF-1. PD-L1, ALK, and ROS1 negative. \par \par PFTs on 2/18/2020: %, FEV1 131%, DLCO 102%.\par \par Brain MRI on 2/21/2020:\par - CHUY\par \par PET/CT on 2/27/2020:\par - few stable active nodes involving both sides of the neck: a 1.5cm SUV=7.7 Rt submandibular node (image 28); an 8mm SUV=4.5 Lt submandibular node (image 25)\par - a stable 1.8cm nodule in the subcutaneous fat in the Rt maxilla (image 19) w/out activity, likely benign\par - a 2.8cm SUV=3.5 mixed solid and groundglass opacity in the Rt lung apex (image 47), suspicious for malignancy\par - a 2.5cm SUV=2.2 Rt lung apex ggo (image 43), suspicious for malignancy\par - mild fatty infiltration of the liver\par \par Of note, as per patient, he was seen by ENT and had negative laryngoscopy before. \par \par Now 1.5 yr s/p Rt VATS Robotic-assisted, RULobectomy, MLND on 3/18/2020. 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 Two additional AdenoCA in-situ lesions, 6mm each, are present. \par Post-op complicated with persistent air-leak, discharged home with PneumoSTAT on 3/23/2020. Right PneumoSTAT removed in office on 03/26/2020. \par \par CT Chest on 9/14/20:\par - post-op changes\par - CHUY\par \par CT Chest on 3/19/21:\par - post-op changes: nodular thickening on the oblique fissure up to 3mm, c/w post-op scarring (3:17)\par - CHUY\par \par CT Chest on 9/28/21:\par - post-op changes\par - CHUY\par \par Pt presents today for follow up. Denies SOB, CP, cough.\par

## 2021-10-01 NOTE — CONSULT LETTER
[Dear  ___] : Dear  [unfilled], [Consult Letter:] : I had the pleasure of evaluating your patient, [unfilled]. [( Thank you for referring [unfilled] for consultation for _____ )] : Thank you for referring [unfilled] for consultation for [unfilled] [Please see my note below.] : Please see my note below. [Consult Closing:] : Thank you very much for allowing me to participate in the care of this patient.  If you have any questions, please do not hesitate to contact me. [Sincerely,] : Sincerely, [DrRosenda  ___] : Dr. STEWART [DrRosenda ___] : Dr. STEWART [FreeTextEntry2] : Ravi Samuel MD (Pulm/Referring)\par Jethro Abernathy MD (PCP)\par Edvin Vera MD (ENT) \par German Hanna MD (Cardiology)  [FreeTextEntry3] : Fahad Ann MD, MPH \par System Director of Thoracic Surgery \par Director of Comprehensive Lung and Foregut [place] \par Professor Cardiovascular & Thoracic Surgery  \par [facility] [employer] [facility]\par \par [facility]\par [address]\par Oncology [address]\[place] [address]\par Tel: [phone]\par Fax: [phone]\par

## 2021-10-01 NOTE — ASSESSMENT
[FreeTextEntry1] : Mr. JOSH ALLEN, 76 year old male, former smoker, w/ hx of HTN, HLD, and Lung CA.\par \par Now 1.5 yr s/p Rt VATS Robotic-assisted, RULobectomy, MLND on 3/18/2020. 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 Two additional AdenoCA in-situ lesions, 6mm each, are present. \par Post-op complicated with persistent air-leak, discharged home with PneumoSTAT on 3/23/2020. Right PneumoSTAT removed in office on 03/26/2020. \par \par CT Chest on 9/28/21:\par - post-op changes\par - CHUY\par \par I have reviewed the patient's medical records and diagnostic images at time of this office consultation and have made the following recommendation:\par 1. CT scan showed no evidence of recurrence, recommended patient to return to office in 6mo w/ CT Chest w/o contrast.\par \par \par I personally performed the services described in the documentation, reviewed the documentation recorded by the scribe in my presence and it accurately and completely records my words and actions.\par \par I, Nasim Arnold NP, am scribing for and the presence of ZEINAB Palomino, the following sections HISTORY OF PRESENT ILLNESS, PAST MEDICAL/FAMILY/SOCIAL HISTORY; REVIEW OF SYSTEMS; VITAL SIGNS; PHYSICAL EXAM; DISPOSITION.\par \par

## 2022-03-24 ENCOUNTER — APPOINTMENT (OUTPATIENT)
Dept: THORACIC SURGERY | Facility: CLINIC | Age: 77
End: 2022-03-24
Payer: MEDICARE

## 2022-03-24 VITALS
WEIGHT: 130 LBS | BODY MASS INDEX: 23.4 KG/M2 | SYSTOLIC BLOOD PRESSURE: 121 MMHG | DIASTOLIC BLOOD PRESSURE: 68 MMHG | RESPIRATION RATE: 18 BRPM | OXYGEN SATURATION: 97 % | TEMPERATURE: 97.6 F | HEART RATE: 96 BPM

## 2022-03-24 PROCEDURE — 99214 OFFICE O/P EST MOD 30 MIN: CPT

## 2022-03-28 NOTE — HISTORY OF PRESENT ILLNESS
[FreeTextEntry1] : Mr. JOSH ALLEN, 77 year old male, former smoker, w/ hx of HTN, HLD, BPH, who was seen by Dr. Edvin Vera (ENT) due to "neck swelling", CT neck revealed area of atelectasis or infiltrate at the posterior right lung apex. \par \par CT Chest on 1/21/2020:\par - 2.9 x 2cm subsolid, predominantly groundglass opacity in the RUL (3:23)\par - a 1.6cm Rt apical ggo (3;17)\par \par FNA of RUL on 2/10/2020 at [facility]/. Path revealed RUL AdenoCA, +TTF-1. PD-L1, ALK, and ROS1 negative. \par \par PFTs on 2/18/2020: %, FEV1 131%, DLCO 102%.\par \par Brain MRI on 2/21/2020:\par - CHUY\par \par PET/CT on 2/27/2020:\par - few stable active nodes involving both sides of the neck: a 1.5cm SUV=7.7 Rt submandibular node (image 28); an 8mm SUV=4.5 Lt submandibular node (image 25)\par - a stable 1.8cm nodule in the subcutaneous fat in the Rt maxilla (image 19) w/out activity, likely benign\par - a 2.8cm SUV=3.5 mixed solid and groundglass opacity in the Rt lung apex (image 47), suspicious for malignancy\par - a 2.5cm SUV=2.2 Rt lung apex ggo (image 43), suspicious for malignancy\par - mild fatty infiltration of the liver\par \par Of note, as per patient, he was seen by ENT and had negative laryngoscopy before. \par \par Now 2 yrs s/p Rt VATS Robotic-assisted, RULobectomy, MLND on 3/18/2020. 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 Two additional AdenoCA in-situ lesions, 6mm each, are present. \par Post-op complicated with persistent air-leak, discharged home with PneumoSTAT on 3/23/2020. Right PneumoSTAT removed in office on 03/26/2020. \par \par CT Chest on 3/19/21:\par - post-op changes: nodular thickening on the oblique fissure up to 3mm, c/w post-op scarring (3:17)\par - CHUY\par \par CT Chest on 9/28/21:\par - post-op changes\par - CHUY\par \par CT Chest on 3/22/22:\par - post-op changes\par - CHUY\par \par Patient is here today for a follow up. Patient denies shortness of breath, cough, chest pain, fever, chills.

## 2022-03-28 NOTE — ASSESSMENT
[FreeTextEntry1] : Mr. JOSH ALLEN, 77 year old male, former smoker, w/ hx of HTN, HLD, BPH, and Lung CA.\par \par Now 2 yrs s/p Rt VATS Robotic-assisted, RULobectomy, MLND on 3/18/2020. 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 Two additional AdenoCA in-situ lesions, 6mm each, are present. \par Post-op complicated with persistent air-leak, discharged home with PneumoSTAT on 3/23/2020. Right PneumoSTAT removed in office on 03/26/2020. \par \par CT Chest on 3/22/22:\par - post-op changes\par - CHUY\par \par I have reviewed the patient's medical records and diagnostic images at time of this office consultation and have made the following recommendation:\par 1. CT scan showed no evidence of recurrence, recommended patient to return to office in 6 months with CT Chest w/out contrast. 	\par \par I personally performed the services described in the documentation, reviewed the documentation recorded by the scribe in my presence and it accurately and completely records my words and actions.\par \par I, Nevin Anton NP, am scribing for and the presence of ZEINAB Palomino, the following sections HISTORY OF PRESENT ILLNESS, PAST MEDICAL/FAMILY/SOCIAL HISTORY; REVIEW OF SYSTEMS; VITAL SIGNS; PHYSICAL EXAM; DISPOSITION. \par \par

## 2022-03-28 NOTE — CONSULT LETTER
[Dear  ___] : Dear  [unfilled], [Consult Letter:] : I had the pleasure of evaluating your patient, [unfilled]. [( Thank you for referring [unfilled] for consultation for _____ )] : Thank you for referring [unfilled] for consultation for [unfilled] [Please see my note below.] : Please see my note below. [Consult Closing:] : Thank you very much for allowing me to participate in the care of this patient.  If you have any questions, please do not hesitate to contact me. [Sincerely,] : Sincerely, [DrRosenda  ___] : Dr. STEWART [DrRosenda ___] : Dr. STEWART [FreeTextEntry2] : Ravi Samuel MD (Pulm/Referring)\par Jethro Abernathy MD (PCP)\par Edvin Vera MD (ENT) \par German Hanna MD (Cardiology)  [FreeTextEntry3] : Fahad Ann MD, MPH \par System Director of Thoracic Surgery \par Director of Comprehensive Lung and Foregut [place] \par Professor Cardiovascular & Thoracic Surgery  \par [facility] [employer] [facility]\par \par [facility]\par [address]\par Oncology [address]\[place] [address]\par Tel: [phone]\par Fax: [phone]\par

## 2022-07-20 NOTE — ASU PATIENT PROFILE, ADULT - CAREGIVER PHONE NUMBER
Aden for dr. Anne [place] review and approval
Patient is requesting a RX refill          amLODIPine (NORVASC) 10 mg tablet           dextroamphetamine-amphetamine (ADDERALL) 20 mg tablet he can be reached @ [phone]
[phone] [facility] [phone]

## 2022-09-22 ENCOUNTER — APPOINTMENT (OUTPATIENT)
Dept: THORACIC SURGERY | Facility: CLINIC | Age: 77
End: 2022-09-22

## 2022-09-22 VITALS
DIASTOLIC BLOOD PRESSURE: 70 MMHG | BODY MASS INDEX: 23.58 KG/M2 | TEMPERATURE: 97.8 F | RESPIRATION RATE: 18 BRPM | WEIGHT: 131 LBS | HEART RATE: 95 BPM | SYSTOLIC BLOOD PRESSURE: 136 MMHG | OXYGEN SATURATION: 97 %

## 2022-09-22 PROCEDURE — 99214 OFFICE O/P EST MOD 30 MIN: CPT

## 2022-09-23 NOTE — ASSESSMENT
[FreeTextEntry1] : Mr. JOSH ALLEN, 77 year old male, former smoker, w/ hx of HTN, HLD, BPH, and Lung CA.\par \par Now 2.5 yrs s/p Rt VATS Robotic-assisted, RULobectomy, MLND on 3/18/2020. 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 Two additional AdenoCA in-situ lesions, 6mm each, are present. \par Post-op complicated with persistent air-leak, discharged home with PneumoSTAT on 3/23/2020. Right PneumoSTAT removed in office on 03/26/2020. \par \par CT Chest on 9/20/22:\par - post-op changes\par - CHUY\par \par I have reviewed the patient's medical records and diagnostic images at time of this office consultation and have made the following recommendation:\par 1.  CT scan showed no evidence of recurrence, recommended patient to return to office in 6mo w/ CT Chest w/o contrast.\par \par \par I personally performed the services described in the documentation, reviewed the documentation recorded by the scribe in my presence and it accurately and completely records my words and actions.\par \par I, Nasim Arnold NP, am scribing for and the presence of ZEINAB Palomino, the following sections HISTORY OF PRESENT ILLNESS, PAST MEDICAL/FAMILY/SOCIAL HISTORY; REVIEW OF SYSTEMS; VITAL SIGNS; PHYSICAL EXAM; DISPOSITION.\par \par \par

## 2022-09-23 NOTE — CONSULT LETTER
[Dear  ___] : Dear  [unfilled], [Consult Letter:] : I had the pleasure of evaluating your patient, [unfilled]. [( Thank you for referring [unfilled] for consultation for _____ )] : Thank you for referring [unfilled] for consultation for [unfilled] [Please see my note below.] : Please see my note below. [Consult Closing:] : Thank you very much for allowing me to participate in the care of this patient.  If you have any questions, please do not hesitate to contact me. [Sincerely,] : Sincerely, [DrRosenda  ___] : Dr. STEWART [DrRosenda ___] : Dr. STEWART [FreeTextEntry2] : Ravi Samuel MD (Pulm/Referring)\par Jethro Abernathy MD (PCP)\par Edvin Vera MD (ENT) \par Gemran Hanna MD (Cardiology)  [FreeTextEntry3] : Fahad Ann MD, MPH \par System Director of Thoracic Surgery \par Director of Comprehensive Lung and Foregut [place] \par Professor Cardiovascular & Thoracic Surgery  \par [facility]\par \par [facility]\par [address]\par Oncology [address]\[place] [address]\par Tel: [phone]\par Fax: [phone]\par

## 2022-09-23 NOTE — HISTORY OF PRESENT ILLNESS
[FreeTextEntry1] : Mr. JOSH ALLEN, 77 year old male, former smoker, w/ hx of HTN, HLD, BPH, who was seen by Dr. Edvin Vera (ENT) due to "neck swelling", CT neck revealed area of atelectasis or infiltrate at the posterior right lung apex. \par \par CT Chest on 1/21/2020:\par - 2.9 x 2cm subsolid, predominantly groundglass opacity in the RUL (3:23)\par - a 1.6cm Rt apical ggo (3;17)\par \par FNA of RUL on 2/10/2020 at [facility]/. Path revealed RUL AdenoCA, +TTF-1. PD-L1, ALK, and ROS1 negative. \par \par PFTs on 2/18/2020: %, FEV1 131%, DLCO 102%.\par \par Brain MRI on 2/21/2020:\par - CHUY\par \par PET/CT on 2/27/2020:\par - few stable active nodes involving both sides of the neck: a 1.5cm SUV=7.7 Rt submandibular node (image 28); an 8mm SUV=4.5 Lt submandibular node (image 25)\par - a stable 1.8cm nodule in the subcutaneous fat in the Rt maxilla (image 19) w/out activity, likely benign\par - a 2.8cm SUV=3.5 mixed solid and groundglass opacity in the Rt lung apex (image 47), suspicious for malignancy\par - a 2.5cm SUV=2.2 Rt lung apex ggo (image 43), suspicious for malignancy\par - mild fatty infiltration of the liver\par \par Of note, as per patient, he was seen by ENT and had negative laryngoscopy before. \par \par Now 2.5 yrs s/p Rt VATS Robotic-assisted, RULobectomy, MLND on 3/18/2020. 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 Two additional AdenoCA in-situ lesions, 6mm each, are present. \par Post-op complicated with persistent air-leak, discharged home with PneumoSTAT on 3/23/2020. Right PneumoSTAT removed in office on 03/26/2020. \par \par CT Chest on 9/28/21:\par - post-op changes\par - CHUY\par \par CT Chest on 3/22/22:\par - post-op changes\par - CHUY\par \par CT Chest on 9/20/22:\par - post-op changes\par - CHUY\par \par Patient is here today for a follow up.  Denies SOB, CP, cough.

## 2023-03-24 ENCOUNTER — APPOINTMENT (OUTPATIENT)
Dept: THORACIC SURGERY | Facility: CLINIC | Age: 78
End: 2023-03-24
Payer: MEDICARE

## 2023-03-24 PROCEDURE — 99443: CPT

## 2023-03-24 NOTE — CONSULT LETTER
[Dear  ___] : Dear  [unfilled], [Consult Letter:] : I had the pleasure of evaluating your patient, [unfilled]. [( Thank you for referring [unfilled] for consultation for _____ )] : Thank you for referring [unfilled] for consultation for [unfilled] [Please see my note below.] : Please see my note below. [Consult Closing:] : Thank you very much for allowing me to participate in the care of this patient.  If you have any questions, please do not hesitate to contact me. [Sincerely,] : Sincerely, [DrRosenda  ___] : Dr. STEWART [DrRosenda ___] : Dr. STEWART [FreeTextEntry2] : Ravi Samuel MD (Pulm/Referring)\par Jethro Abernathy MD (PCP)\par Edvin Vera MD (ENT) \par German Hanna MD (Cardiology)  [FreeTextEntry3] : Fahad Ann MD, MPH \par System Director of Thoracic Surgery \par Director of Comprehensive Lung and Foregut [place] \par Professor Cardiovascular & Thoracic Surgery  \par [facility]\par \par [facility]\par [address]\par Oncology [address]\[place] [address]\par Tel: [phone]\par Fax: [phone]\par

## 2023-03-24 NOTE — REASON FOR VISIT
[Home] : at home, [unfilled] , at the time of the visit. [Medical Office: ([facility])___] : at the medical office located in  [Spouse] : spouse [Patient] : the patient [Self] : self [This encounter was initiated by telehealth (audio with video) and converted to telephone (audio only) due to technical difficulties.] : This encounter was initiated by telehealth (audio with video) and converted to telephone (audio only) due to technical difficulties. [Follow-Up: _____] : a [unfilled] follow-up visit

## 2023-03-24 NOTE — HISTORY OF PRESENT ILLNESS
[FreeTextEntry1] : Mr. JOSH ALLEN, 78 year old male, former smoker, w/ hx of HTN, HLD, BPH, who was seen by Dr. Edvin Vera (ENT) due to "neck swelling", CT neck revealed area of atelectasis or infiltrate at the posterior right lung apex. \par \par CT Chest on 1/21/2020:\par - 2.9 x 2cm subsolid, predominantly groundglass opacity in the RUL (3:23)\par - a 1.6cm Rt apical ggo (3;17)\par \par FNA of RUL on 2/10/2020 at [facility]/. Path revealed RUL AdenoCA, +TTF-1. PD-L1, ALK, and ROS1 negative. \par \par PFTs on 2/18/2020: %, FEV1 131%, DLCO 102%.\par \par Brain MRI on 2/21/2020:\par - CHUY\par \par PET/CT on 2/27/2020:\par - few stable active nodes involving both sides of the neck: a 1.5cm SUV=7.7 Rt submandibular node (image 28); an 8mm SUV=4.5 Lt submandibular node (image 25)\par - a stable 1.8cm nodule in the subcutaneous fat in the Rt maxilla (image 19) w/out activity, likely benign\par - a 2.8cm SUV=3.5 mixed solid and groundglass opacity in the Rt lung apex (image 47), suspicious for malignancy\par - a 2.5cm SUV=2.2 Rt lung apex ggo (image 43), suspicious for malignancy\par - mild fatty infiltration of the liver\par \par Of note, as per patient, he was seen by ENT and had negative laryngoscopy before. \par \par Now 3 yrs s/p Rt VATS Robotic-assisted, RULobectomy, MLND on 3/18/2020. 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 Two additional AdenoCA in-situ lesions, 6mm each, are present. \par Post-op complicated with persistent air-leak, discharged home with PneumoSTAT on 3/23/2020. Right PneumoSTAT removed in office on 03/26/2020. \par \par CT Chest on 3/22/22:\par - post-op changes\par - CHUY\par \par CT Chest on 9/20/22:\par - post-op changes\par - CHUY\par \par CT Chest on 3/16/23 at [facility]:\par - post-op changes\par - CHUY\par \par Patient is followed today via Telehealth visit.  Denies SOB, CP, cough.

## 2023-11-30 NOTE — PATIENT PROFILE ADULT - NSTRANSFERBELONGINGSRESP_GEN_A_NUR
Infusion Nursing Note:  Briana Hicksreinaldo presents today for [place].    Patient seen by provider today: No   present during visit today: Not Applicable.    Note: N/A.    Intravenous Access:  Peripheral IV placed.    Treatment Conditions:  Biological Infusion Checklist:  ~~~ NOTE: If the patient answers yes to any of the questions below, hold the infusion and contact ordering provider or on-call provider.    Have you recently had an elevated temperature, fever, chills, productive cough, coughing for 3 weeks or longer or hemoptysis,  abnormal vital signs, night sweats,  chest pain or have you noticed a decrease in your appetite, unexplained weight loss or fatigue? No  Do you have any open wounds or new incisions? No  Do you have any upcoming hospitalizations or surgeries? Does not include esophagogastroduodenoscopy, colonoscopy, endoscopic retrograde cholangiopancreatography (ERCP), endoscopic ultrasound (EUS), dental procedures or joint aspiration/steroid injections No  Do you currently have any signs of illness or infection or are you on any antibiotics? No  Have you had any new, sudden or worsening abdominal pain? No  Have you or anyone in your household received a live vaccination in the past 4 weeks? Please note: No live vaccines while on biologic/chemotherapy until 6 months after the last treatment. Patient can receive the flu vaccine (shot only), pneumovax and the Covid vaccine. It is optimal for the patient to get these vaccines mid cycle, but they can be given at any time as long as it is not on the day of the infusion. No  Have you recently been diagnosed with any new nervous system diseases (ie. Multiple sclerosis, [place], seizures, neurological changes) or cancer diagnosis? Are you on any form of radiation or chemotherapy? No  Are you pregnant or breast feeding or do you have plans of pregnancy in the future? No  Have you been having any signs of worsening depression or suicidal  ideations?  (benlysta only) No  Have there been any other new onset medical symptoms? No  Have you had any new blood clots? (IVIG only) No  Post Infusion Assessment:  Patient tolerated infusion without incident.  Blood return noted pre and post infusion.  Site patent and intact, free from redness, edema or discomfort.  No evidence of extravasations.  Access discontinued per protocol.     Discharge Plan:   Discharge instructions reviewed with: Patient.  Patient and/or family verbalized understanding of discharge instructions and all questions answered.  AVS to patient via [employer].  Patient will return 12/28/2023 for next appointment.   Patient discharged in stable condition accompanied by: self.  Departure Mode: Ambulatory.    Deanna Pastor RN   yes

## 2024-04-25 PROBLEM — C34.91 ADENOCARCINOMA OF LUNG, RIGHT: Status: ACTIVE | Noted: 2020-03-04

## 2024-04-25 PROBLEM — R91.8 MULTIPLE LUNG NODULES: Status: ACTIVE | Noted: 2024-04-25

## 2024-05-02 ENCOUNTER — APPOINTMENT (OUTPATIENT)
Dept: THORACIC SURGERY | Facility: CLINIC | Age: 79
End: 2024-05-02
Payer: MEDICARE

## 2024-05-02 VITALS
SYSTOLIC BLOOD PRESSURE: 118 MMHG | BODY MASS INDEX: 23.58 KG/M2 | WEIGHT: 131 LBS | OXYGEN SATURATION: 98 % | RESPIRATION RATE: 16 BRPM | HEART RATE: 87 BPM | DIASTOLIC BLOOD PRESSURE: 68 MMHG

## 2024-05-02 DIAGNOSIS — R91.8 OTHER NONSPECIFIC ABNORMAL FINDING OF LUNG FIELD: ICD-10-CM

## 2024-05-02 DIAGNOSIS — C34.91 MALIGNANT NEOPLASM OF UNSPECIFIED PART OF RIGHT BRONCHUS OR LUNG: ICD-10-CM

## 2024-05-02 PROCEDURE — 99213 OFFICE O/P EST LOW 20 MIN: CPT

## 2024-05-03 NOTE — HISTORY OF PRESENT ILLNESS
[FreeTextEntry1] : Mr. JOSH ALLEN, 79 year old male, former smoker, w/ hx of HTN, HLD, BPH, who was seen by Dr. Edvin Vera (ENT) due to "neck swelling", CT neck revealed area of atelectasis or infiltrate at the posterior right lung apex.   CT Chest on 1/21/2020: - 2.9 x 2cm subsolid, predominantly groundglass opacity in the RUL (3:23) - a 1.6cm Rt apical ggo (3;17)  FNA of RUL on 2/10/2020 at [facility]/. Path revealed RUL AdenoCA, +TTF-1. PD-L1, ALK, and ROS1 negative.   PFTs on 2/18/2020: %, FEV1 131%, DLCO 102%.  Brain MRI on 2/21/2020: - CHUY  PET/CT on 2/27/2020: - few stable active nodes involving both sides of the neck: a 1.5cm SUV=7.7 Rt submandibular node (image 28); an 8mm SUV=4.5 Lt submandibular node (image 25) - a stable 1.8cm nodule in the subcutaneous fat in the Rt maxilla (image 19) w/out activity, likely benign - a 2.8cm SUV=3.5 mixed solid and groundglass opacity in the Rt lung apex (image 47), suspicious for malignancy - a 2.5cm SUV=2.2 Rt lung apex ggo (image 43), suspicious for malignancy - mild fatty infiltration of the liver  Of note, as per patient, he was seen by ENT and had negative laryngoscopy before.   Now 4 yrs s/p Rt VATS Robotic-assisted, RULobectomy, MLND on 3/18/2020. 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 Two additional AdenoCA in-situ lesions, 6mm each, are present.  Post-op complicated with persistent air-leak, discharged home with PneumoSTAT on 3/23/2020. Right PneumoSTAT removed in office on 03/26/2020.   CT Chest on 3/22/22: - post-op changes - CHUY  CT Chest on 9/20/22: - post-op changes - CHUY  CT Chest on 3/16/23 at [facility]: - post-op changes - CHUY  CT Chest on 4/17/24 at [facility]: - post-op changes - A 10 mm left upper lobe ground-glass pulmonary nodule (series 5, image 55) has not significantly changed since March 16, 2023.  - Multiple other pulmonary nodules measuring up to 3 mm in size are unchanged since March 16 2023; as one selected example, there is an unchanged 3 mm left upper lobe pulmonary nodule (series 5, image 63).  - There is no pleural effusion or pneumothorax on either side.  Patient is s/p left knee replacement in 11/2023, pending right knee replacement on 05/22/2024.   Patient is here today for a follow up. Patient denies shortness of breath, cough, chest pain, fever, chills.

## 2024-05-03 NOTE — ASSESSMENT
[FreeTextEntry1] : Mr. JOSH ALLEN, 78 year old male, former smoker, w/ hx of HTN, HLD, BPH, and Lung CA.  Now 4 yrs s/p Rt VATS Robotic-assisted, RULobectomy, MLND on 3/18/2020. 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 Two additional AdenoCA in-situ lesions, 6mm each, are present.  Post-op complicated with persistent air-leak, discharged home with PneumoSTAT on 3/23/2020. Right PneumoSTAT removed in office on 03/26/2020.   CT Chest on 4/17/24 at [facility]: - post-op changes - A 10 mm left upper lobe ground-glass pulmonary nodule (series 5, image 55) has not significantly changed since March 16, 2023.  - Multiple other pulmonary nodules measuring up to 3 mm in size are unchanged since March 16 2023; as one selected example, there is an unchanged 3 mm left upper lobe pulmonary nodule (series 5, image 63).  - There is no pleural effusion or pneumothorax on either side.  I have reviewed the patient's medical records and diagnostic images at time of this office consultation and have made the following recommendation: 1. CT chest reviewed and explained to patient, MANJIT nodule is not significantly changed, I recommended patient to return to office in 6 months with CT Chest without contrast.   I, ZEINAB Palomino, personally performed the evaluation and management (E/M) services for this established patient who follow up today with an existing condition.  That E/M includes conducting the examination, assessing all new/exacerbated/existing conditions, and establishing a plan of care.  Today, my ACP, CATHIE AustinC, was here to observe my evaluation and management services for this existing condition to be followed going forward.

## 2024-05-03 NOTE — CONSULT LETTER
[Dear  ___] : Dear  [unfilled], [Consult Letter:] : I had the pleasure of evaluating your patient, [unfilled]. [( Thank you for referring [unfilled] for consultation for _____ )] : Thank you for referring [unfilled] for consultation for [unfilled] [Please see my note below.] : Please see my note below. [Consult Closing:] : Thank you very much for allowing me to participate in the care of this patient.  If you have any questions, please do not hesitate to contact me. [Sincerely,] : Sincerely, [DrRosenda  ___] : Dr. STEWART [DrRosenda ___] : Dr. STEWART [FreeTextEntry2] : Ravi Samuel MD (Pulm/Referring)\par  Jethro Abernathy MD (PCP)\par  Edvin Vera MD (ENT) \par  German Hanna MD (Cardiology)  [FreeTextEntry3] : Fahad Ann MD, MPH \par  System Director of Thoracic Surgery \par  Director of Comprehensive Lung and Foregut [place] \par  Professor Cardiovascular & Thoracic Surgery  \par  [facility]\par  \par  [facility]\par  [address]\par  Oncology [address]\[place] [address]\par  Tel: [phone]\par  Fax: [phone]\par

## 2024-12-19 ENCOUNTER — APPOINTMENT (OUTPATIENT)
Dept: THORACIC SURGERY | Facility: CLINIC | Age: 79
End: 2024-12-19
Payer: MEDICARE

## 2024-12-19 VITALS
BODY MASS INDEX: 24.12 KG/M2 | SYSTOLIC BLOOD PRESSURE: 108 MMHG | WEIGHT: 134 LBS | DIASTOLIC BLOOD PRESSURE: 67 MMHG | RESPIRATION RATE: 18 BRPM | HEART RATE: 85 BPM | TEMPERATURE: 98 F

## 2024-12-19 DIAGNOSIS — C34.91 MALIGNANT NEOPLASM OF UNSPECIFIED PART OF RIGHT BRONCHUS OR LUNG: ICD-10-CM

## 2024-12-19 PROCEDURE — 99213 OFFICE O/P EST LOW 20 MIN: CPT

## 2025-04-29 ENCOUNTER — APPOINTMENT (OUTPATIENT)
Dept: UROLOGY | Facility: CLINIC | Age: 80
End: 2025-04-29
Payer: MEDICARE

## 2025-04-29 VITALS
BODY MASS INDEX: 24.3 KG/M2 | WEIGHT: 135 LBS | HEART RATE: 94 BPM | OXYGEN SATURATION: 96 % | SYSTOLIC BLOOD PRESSURE: 119 MMHG | DIASTOLIC BLOOD PRESSURE: 62 MMHG | RESPIRATION RATE: 18 BRPM | TEMPERATURE: 97.9 F

## 2025-04-29 DIAGNOSIS — N40.0 BENIGN PROSTATIC HYPERPLASIA WITHOUT LOWER URINARY TRACT SYMPMS: ICD-10-CM

## 2025-04-29 DIAGNOSIS — R97.20 ELEVATED PROSTATE, SPECIFIC ANTIGEN [PSA]: ICD-10-CM

## 2025-04-29 DIAGNOSIS — R97.20 BENIGN PROSTATIC HYPERPLASIA WITHOUT LOWER URINARY TRACT SYMPMS: ICD-10-CM

## 2025-04-29 LAB
ANION GAP SERPL CALC-SCNC: 15 MMOL/L
BUN SERPL-MCNC: 16 MG/DL
CALCIUM SERPL-MCNC: 9.9 MG/DL
CHLORIDE SERPL-SCNC: 102 MMOL/L
CO2 SERPL-SCNC: 24 MMOL/L
CREAT SERPL-MCNC: 0.84 MG/DL
EGFRCR SERPLBLD CKD-EPI 2021: 87 ML/MIN/1.73M2
GLUCOSE SERPL-MCNC: 117 MG/DL
POTASSIUM SERPL-SCNC: 4.4 MMOL/L
PSA FREE FLD-MCNC: 10 %
PSA FREE SERPL-MCNC: 0.94 NG/ML
PSA SERPL-MCNC: 9.02 NG/ML
SODIUM SERPL-SCNC: 141 MMOL/L

## 2025-04-29 PROCEDURE — G2211 COMPLEX E/M VISIT ADD ON: CPT

## 2025-04-29 PROCEDURE — 99204 OFFICE O/P NEW MOD 45 MIN: CPT

## 2025-04-29 RX ORDER — SILODOSIN 8 MG/1
8 CAPSULE ORAL DAILY
Qty: 90 | Refills: 3 | Status: ACTIVE | COMMUNITY
Start: 2025-04-29 | End: 1900-01-01

## 2025-04-29 RX ORDER — SODIUM PHOSPHATE, DIBASIC AND SODIUM PHOSPHATE, MONOBASIC 7; 19 G/230ML; G/230ML
ENEMA RECTAL
Qty: 1 | Refills: 0 | Status: ACTIVE | COMMUNITY
Start: 2025-04-29 | End: 1900-01-01

## 2025-05-30 ENCOUNTER — APPOINTMENT (OUTPATIENT)
Dept: UROLOGY | Facility: CLINIC | Age: 80
End: 2025-05-30
Payer: MEDICARE

## 2025-05-30 VITALS
DIASTOLIC BLOOD PRESSURE: 69 MMHG | SYSTOLIC BLOOD PRESSURE: 135 MMHG | WEIGHT: 132 LBS | HEART RATE: 108 BPM | BODY MASS INDEX: 23.76 KG/M2 | OXYGEN SATURATION: 95 % | RESPIRATION RATE: 18 BRPM

## 2025-05-30 DIAGNOSIS — N40.0 BENIGN PROSTATIC HYPERPLASIA WITHOUT LOWER URINARY TRACT SYMPMS: ICD-10-CM

## 2025-05-30 DIAGNOSIS — R97.20 BENIGN PROSTATIC HYPERPLASIA WITHOUT LOWER URINARY TRACT SYMPMS: ICD-10-CM

## 2025-05-30 PROCEDURE — 99214 OFFICE O/P EST MOD 30 MIN: CPT

## 2025-05-30 PROCEDURE — G2211 COMPLEX E/M VISIT ADD ON: CPT

## 2025-05-30 RX ORDER — FINASTERIDE 5 MG/1
5 TABLET, FILM COATED ORAL
Qty: 90 | Refills: 3 | Status: ACTIVE | COMMUNITY
Start: 2025-05-30 | End: 1900-01-01